# Patient Record
Sex: MALE | Race: WHITE | Employment: UNEMPLOYED | URBAN - METROPOLITAN AREA
[De-identification: names, ages, dates, MRNs, and addresses within clinical notes are randomized per-mention and may not be internally consistent; named-entity substitution may affect disease eponyms.]

---

## 2021-07-26 ENCOUNTER — HOSPITAL ENCOUNTER (OUTPATIENT)
Dept: PHYSICAL THERAPY | Age: 26
Discharge: HOME | End: 2021-07-26
Payer: COMMERCIAL

## 2021-07-26 PROCEDURE — 97110 THERAPEUTIC EXERCISES: CPT | Performed by: PHYSICAL THERAPIST

## 2021-07-26 PROCEDURE — 97116 GAIT TRAINING THERAPY: CPT | Performed by: PHYSICAL THERAPIST

## 2021-07-26 PROCEDURE — 97161 PT EVAL LOW COMPLEX 20 MIN: CPT | Performed by: PHYSICAL THERAPIST

## 2021-07-26 NOTE — PROGRESS NOTES
[facility]  [address], [address] [facility]    OUTPATIENT PHYSICAL THERAPY    INITIAL EVALUATION      NAME: Shan Petersen AGE: 27 y.o. GENDER: male  DATE: 7/26/2021  REFERRING PHYSICIAN: Logan Cárdenas MD    OBJECTIVE DATA SUMMARY:   Medical Diagnosis: R26.89 abnormalities of gait and mobility  PT Diagnosis: CVA   Date of Onset: 7/11/21  Mechanism of Injury/Chief Complaint: CVA causing R sided weakness and slurred speech. Patient indicates that a dysfunction at the [place] [facility] caused his CVA. Present Symptoms: R sided weakness, gait abnormality     Functional Deficits and Limitations:   []     Sitting:   []    Dressing:   [x]    Reaching:  [x]     Standing:   []     Bathing:   [x]    Lifting:  [x]     Walking:   [x]     Cooking:   []    Yardwork:  []     Sleeping:   [x]     Cleaning:   []     Driving:  []     Work Tasks:  [x]     Recreation:  [x]    Other: caring for grandmother    HISTORY:  Past Medical History:   No past medical history on file. No past surgical history on file. Precautions: Allergies: Pcn [penicillins]    Current Medications:    Medication    plavix    atorvastatin    Aspirin    Clopidogrel    Ferrous Sulfate    Fluconazole      Prior Level of Function/Home Situation: Independent living with and caring for grandmother    Personal factors and/or comorbidities impacting plan of care: stress    Social/Work History:  Helps care for grandmother     Previous Therapy:  None     SUBJECTIVE:   \"I don't know how it happened. I just woke up and my legs didn't work. I went to the hospital about a day after my symptoms started.  They said the wishbone artery in the back of my head wasn't working right\"    Patients goals for therapy: Get back to walking normal, get stronger so I can care for my grandma    OBJECTIVE DATA SUMMARY:   EXAMINATION/PRESENTATION/DECISION MAKING:   Pain:  Location: N/A  Quality: No pain reported  Now: 0/10  Best: 0/10  Worst: 0/10  Easing Factors: walking with rest breaks  Aggravating Factors: quick fatigue    Strength:      LEFT  RIGHT  Shoulder flexion 5/5  4/5  Shoulder abduction 5/5  5/5    Shoulder ER  5/5  4/5  Shoulder IR  5/5  5/5  Elbow flexion  5/5  5/5  Elbow extension 5/5  5/5  Wrist flexion  5/5  5/5  Wrist extension 5/5  4/5    Hip flexion  5/5  4/5  Hip abduction  5/5  4/5  Hip extension  5/5  4/5  Knee flexion  5/5  5/5  Knee extension 5/5  4+/5   Ankle DF  4/5  4/5  Ankle PF  3/5  2/5       Handheld dynamometer testing:  L hand 100lbs  R hand 75lbs    Range of Motion:   All ROM WNL    Neurologic Assessment:   Sensation: Decreased R sided light touch sensation on plantar foot   Reflexes: 3+ R sided reflexes (patellar, achilles, biceps, triceps)    Mobility:   Transitional Movements: slowed with use of BUE    Gait: Trendelenburg sign present, decreased R foot clearance    Functional Measure:   Tinetti: 12/28  Balance: 8/16  Gait: 4/12     Physical Therapy Evaluation Charge Determination   History Examination Presentation Decision-Making   LOW Complexity : Zero comorbidities / personal factors that will impact the outcome / POC LOW Complexity : 1-2 Standardized tests and measures addressing body structure, function, activity limitation and / or participation in recreation  LOW Complexity : Stable, uncomplicated  Other outcome measures Tinetti  LOW       Based on the above components, the patient evaluation is determined to be of the following complexity level: LOW     TREATMENT/INTERVENTION:  Modalities (Rationale): none today    Therapeutic Exercises: to develop strength, endurance, range of motion, and flexibility  Bridge  SL clamshell with RTB  Side stepping RTB  Shoulder flexion 5#  Gripping (power, key, pinch) with green putty    Manual Therapy: for joint mobilization/manipulations and soft tissue mobilization  None today    Neuro Re-Education: to improve movement, balance, coordination, kinesthetic sense, posture, and proprioception for sitting or standing balance  None today    Therapeutic Activities: to improve functional performance, power, or agility  None today    Ambulation/Gait Training:  Walking toward mirror for external feedback to reduce hip drop  Tandem walking    Activity tolerance and post treatment pain report:   Good, hip muscle soreness noted    Education:  [x]     Home exercise program provided. [facility].  URL: [email or web address]  Date: 07/26/2021  Prepared by: Nuvia Houston    Exercises  Supine Bridge - 2-3 x daily - 7 x weekly - 3 sets - 10 reps  Clamshell with Resistance - 2-3 x daily - 7 x weekly - 3 sets - 10 reps  Side Stepping with Resistance at Thighs - 2-3 x daily - 7 x weekly - 3 sets - 15-25ft hold  Tandem Walking with Counter Support - 1-2 x daily - 7 x weekly - 3 reps - 15-25 ft hold  Putty Squeezes - 2-3 x daily - 7 x weekly - 2min hold  3-Point Pinch with Putty - 2-3 x daily - 7 x weekly - 2min hold  Thumb MCP and IP Flexion with Putty - 2-3 x daily - 7 x weekly - 2min hold  Standing Shoulder Flexion to 90 Degrees with Dumbbells - 2-3 x daily - 7 x weekly - 3 sets - 10 reps    Education was provided to the patient on the following topics: HEP performance, gait mechanics. Patient verbalized understanding of the topics presented. ASSESSMENT:   Fly Bronson is a 27 y.o. male who presents with right sided weakness and decreased ambulatory ability following 7/11/21 CVA. Physical therapy problems based on objective data include: decrease ROM, decrease strength, impaired gait/ balance, decrease ADL/ functional abilitiies, decrease activity tolerance and decrease transfer abilities . Patient will benefit from skilled intervention to address these impairments to allow for improved ambulation and ability to care for family member. Rehabilitation potential is considered to be Good.       PLAN OF CARE:   Recommendations and Planned Interventions Include:  therapeutic activities, therapeutic exercises, gait training, balance training, manual therapy, neuro re-education, posture/biomechanics, traction, heat/ice, ultrasound, E-stim, home exercise program, TENS, pain management and dry needling     Frequency/Duration:  Patient will benefit from physical therapy visits 1-2 times per week over 8 weeks to optimize improvement in these areas. GOALS  Short term goals  Time frame: 4 weeks  1. Patient will be compliant and independent with the initial HEP as evidenced by being able to perform without cuing. 2. Patient will report a 50% improvement in symptoms. 3. Patient will maintain tandem stance for 30s without loss of balance. 4. Patient will have a show 4/5 knee extension strength to assist with standing and walking. 5. Patient will have an increased tolerance for standing to allow 45 minutes of the activity before symptoms start. 6. Patient will tolerate 45 minutes of clinic activities before onset of symptoms. Long term goals  Time frame: 8 weeks  1. Patient will have an improved score on the Tinetti outcome measure by 12 points to demonstrate a decreased fall risk. 2. Patient will be independent in final individualized HEP. 3. Patient will negotiate a flight of stairs without deviation. 4. Patient will have an increase in hip extension strength to 5/5 to allow performance of standing and walking tasks. 5. Patient will return to caregiving without being limited by symptoms. 6. Patient will show R hand  strength of 100lbs. [x]     Patient has participated in goal setting and agrees to work toward plan of care. [x]     Patient was instructed to call if questions or concerns arise.     Thank you for this referral.  Maria Antonia Herrera, PT   Time Calculation: 54 mins    Patient Time in clinic:   Start Time: 0900   Stop Time: 5025    TREATMENT PLAN EFFECTIVE DATES:   7/26/2021 TO 9/20/21  I have read the above plan of care for Laina Hua and certify the need for skilled physical therapy services.     Physician Signature: ____________________________________________________    Date: _________________________________________________________________

## 2021-08-02 ENCOUNTER — HOSPITAL ENCOUNTER (OUTPATIENT)
Dept: PHYSICAL THERAPY | Age: 26
Discharge: HOME | End: 2021-08-02
Payer: COMMERCIAL

## 2021-08-02 PROCEDURE — 97110 THERAPEUTIC EXERCISES: CPT | Performed by: PHYSICAL THERAPIST

## 2021-08-02 PROCEDURE — 97116 GAIT TRAINING THERAPY: CPT | Performed by: PHYSICAL THERAPIST

## 2021-08-02 NOTE — PROGRESS NOTES
[facility]  [address], [address] [facility]    OUTPATIENT PHYSICAL THERAPY DAILY TREATMENT NOTE  VISIT: 2    NAME: Noralee Phalen AGE: 27 y.o. GENDER: male  DATE: 8/2/2021  REFERRING PHYSICIAN: Brannon Carrasquillo MD    GOALS  Short term goals  Time frame: 4 weeks  1. Patient will be compliant and independent with the initial HEP as evidenced by being able to perform without cuing. 2. Patient will report a 50% improvement in symptoms. 3. Patient will maintain tandem stance for 30s without loss of balance. 4. Patient will have a show 4/5 knee extension strength to assist with standing and walking. 5. Patient will have an increased tolerance for standing to allow 45 minutes of the activity before symptoms start. 6. Patient will tolerate 45 minutes of clinic activities before onset of symptoms.      Long term goals  Time frame: 8 weeks  1. Patient will have an improved score on the Tinetti outcome measure by 12 points to demonstrate a decreased fall risk. 2. Patient will be independent in final individualized HEP. 3. Patient will negotiate a flight of stairs without deviation. 4. Patient will have an increase in hip extension strength to 5/5 to allow performance of standing and walking tasks. 5. Patient will return to caregiving without being limited by symptoms. 6. Patient will show R hand  strength of 100lbs. SUBJECTIVE:   \"My legs get sore with the exercises. My dad has noticed my walking has improved. I haven't had to walk up steps, but I have had some difficulty with hills. \"    Pain In: 0/10    OBJECTIVE DATA SUMMARY:     EXAMINATION/PRESENTATION/DECISION MAKING:   Pain:  Location: N/A  Quality: No pain reported  Now: 0/10  Best: 0/10  Worst: 0/10  Easing Factors: walking with rest breaks  Aggravating Factors: quick fatigue     Strength:                                       LEFT               RIGHT  Shoulder flexion          5/5 4/5  Shoulder abduction     5/5                   5/5                     Shoulder ER                5/5                   4/5  Shoulder IR                 5/5                   5/5  Elbow flexion               5/5                   5/5  Elbow extension          5/5                   5/5  Wrist flexion                5/5                   5/5  Wrist extension           5/5                   4/5     Hip flexion                   5/5                   4/5  Hip abduction              5/5                   4/5  Hip extension              5/5                   4/5  Knee flexion                5/5                   5/5  Knee extension           5/5                   4+/5       Ankle DF                     4/5                   4/5  Ankle PF                     3/5                   2/5         Handheld dynamometer testing:  L hand 100lbs  R hand 75lbs     Range of Motion:   All ROM WNL     Neurologic Assessment:              Sensation: Decreased R sided light touch sensation on plantar foot              Reflexes: 3+ R sided reflexes (patellar, achilles, biceps, triceps)     Mobility:              Transitional Movements: slowed with use of BUE               Gait: Trendelenburg sign present, decreased R foot clearance     Functional Measure:   Tinetti: 12/28  Balance: 8/16  Gait: 4/12     TREATMENT/INTERVENTION:  Modalities (Rationale): none today     Therapeutic Exercises: to develop strength, endurance, range of motion, and flexibility  Bridge with ECC lowering 3x 10  SL clamshell with GTB 2x 10  Side stepping GTB  Shoulder flexion GTB 2x 10  Gripping (power, key, pinch) with green putty  Power  with 3lb med ball 10x  Wrist extension 5# 10x  Wrist flexion 5# 10x    Manual Therapy: for joint mobilization/manipulations and soft tissue mobilization  None today     Neuro Re-Education: to improve movement, balance, coordination, kinesthetic sense, posture, and proprioception for sitting or standing balance  None today     Therapeutic Activities: to improve functional performance, power, or agility  None today     Ambulation/Gait Training:  Walking toward mirror for external feedback to reduce hip drop  Tandem walking 100ft  Step and stance training at bars with focus on hip and trunk position       Activity tolerance and post treatment pain report:   Good improved gait mechanics    Pain Out: 0/10    Education:  Education was provided to the patient on the following topics: Reducing [place] compensation and Trendelenburg sign. Safety awareness. [x]    No changes were made to the home exercise program.  []    The following changes were made to the home exercise program:     Access Code: [address] [place] [address] Avenue: [email or web address]  Date: 07/26/2021  Prepared by: Bruce [place]     Exercises  Supine Bridge - 2-3 x daily - 7 x weekly - 3 sets - 10 reps  Clamshell with Resistance - 2-3 x daily - 7 x weekly - 3 sets - 10 reps  Side Stepping with Resistance at Thighs - 2-3 x daily - 7 x weekly - 3 sets - 15-25ft hold  Tandem Walking with Counter Support - 1-2 x daily - 7 x weekly - 3 reps - 15-25 ft hold  Putty Squeezes - 2-3 x daily - 7 x weekly - 2min hold  3-Point Pinch with Putty - 2-3 x daily - 7 x weekly - 2min hold  Thumb MCP and IP Flexion with Putty - 2-3 x daily - 7 x weekly - 2min hold  Standing Shoulder Flexion to 90 Degrees with Dumbbells - 2-3 x daily - 7 x weekly - 3 sets - 10 reps    Patient verbalized understanding of the topics presented. ASSESSMENT:   Patient shows improved strength and gait mechanics since last visit. He continues to show over-reliance on L side and decreased strength in R extremities. Patient will continue to benefit from PT intervention to improve strength, gait mechanics, and safety.      Patients progression toward goals is as follows:  [x]     Improving appropriately and progressing toward goals  []     Improving slowly and progressing toward goals  []     Not making progress toward goals and plan of care will be adjusted    PLAN OF CARE:   Patient continues to benefit from skilled intervention to address the above impairments. [x]    Continue treatment per established plan of care. []     Recommend the following changes to the plan of care:     Recommendations/Intent for next treatment: Progress balance and gait activities as appropriate. Reassess  strength.     Lucien Harrison, PT   Time Calculation: 30 mins  Patient Time in clinic:   Start Time: 0900   Stop Time: 0041

## 2021-08-09 ENCOUNTER — HOSPITAL ENCOUNTER (OUTPATIENT)
Dept: PHYSICAL THERAPY | Age: 26
Discharge: HOME | End: 2021-08-09
Payer: COMMERCIAL

## 2021-08-09 PROCEDURE — 97110 THERAPEUTIC EXERCISES: CPT | Performed by: PHYSICAL THERAPIST

## 2021-08-09 PROCEDURE — 97112 NEUROMUSCULAR REEDUCATION: CPT | Performed by: PHYSICAL THERAPIST

## 2021-08-09 NOTE — PROGRESS NOTES
[facility]  [address], [address] [facility]    OUTPATIENT PHYSICAL THERAPY DAILY TREATMENT NOTE  VISIT: 3    NAME: Jesica Guadalupe AGE: 27 y.o. GENDER: male  DATE: 8/9/2021  REFERRING PHYSICIAN: Sherrie Feldman MD    GOALS  Short term goals  Time frame: 4 weeks  1. Patient will be compliant and independent with the initial HEP as evidenced by being able to perform without cuing. 2. Patient will report a 50% improvement in symptoms. 3. Patient will maintain tandem stance for 30s without loss of balance. 4. Patient will have a show 4/5 knee extension strength to assist with standing and walking. 5. Patient will have an increased tolerance for standing to allow 45 minutes of the activity before symptoms start. 6. Patient will tolerate 45 minutes of clinic activities before onset of symptoms.      Long term goals  Time frame: 8 weeks  1. Patient will have an improved score on the Tinetti outcome measure by 12 points to demonstrate a decreased fall risk. 2. Patient will be independent in final individualized HEP. 3. Patient will negotiate a flight of stairs without deviation. 4. Patient will have an increase in hip extension strength to 5/5 to allow performance of standing and walking tasks. 5. Patient will return to caregiving without being limited by symptoms. 6. Patient will show R hand  strength of 100lbs. SUBJECTIVE:   \"I am a little more wobbly today. I didn't sleep well last night because I had restless leg syndrome. \"    Pain In: 0/10    OBJECTIVE DATA SUMMARY:     EXAMINATION/PRESENTATION/DECISION MAKING:   Pain:  Location: N/A  Quality: No pain reported  Now: 0/10  Best: 0/10  Worst: 0/10  Easing Factors: walking with rest breaks  Aggravating Factors: quick fatigue     Strength:                                       LEFT               RIGHT  Shoulder flexion          5/5                   4/5  Shoulder abduction     5/5                   5/5 Shoulder ER                5/5                   4/5  Shoulder IR                 5/5                   5/5  Elbow flexion               5/5                   5/5  Elbow extension          5/5                   5/5  Wrist flexion                5/5                   5/5  Wrist extension           5/5                   4/5     Hip flexion                   5/5                   4/5  Hip abduction              5/5                   4/5  Hip extension              5/5                   4/5  Knee flexion                5/5                   5/5  Knee extension           5/5                   4+/5       Ankle DF                     4/5                   4/5  Ankle PF                     3/5                   2/5         Handheld dynamometer testing:  L hand 105lbs  R hand 85lbs     Range of Motion:   All ROM WNL     Neurologic Assessment:              Sensation: Decreased R sided light touch sensation on plantar foot              Reflexes: 3+ R sided reflexes (patellar, achilles, biceps, triceps)     Mobility:              Transitional Movements: slowed with use of BUE               Gait: Trendelenburg sign present, decreased R foot clearance     Functional Measure:   Tinetti: 12/28  Balance: 8/16  Gait: 4/12     TREATMENT/INTERVENTION:  Modalities (Rationale): none today     Therapeutic Exercises: to develop strength, endurance, range of motion, and flexibility  Supine:  Bridge with GTB and ECC lowering 3x 10  SL clamshell with GTB 3x 10 each leg    Standing:  Side stepping GTB  Shoulder flexion GTB 2x 10    Seated:  Gripping (power, key, pinch) with green putty  Power  with 3lb med ball 10x  Wrist extension 5# 10x  Wrist flexion 5# 10x    Manual Therapy: for joint mobilization/manipulations and soft tissue mobilization  None today     Neuro Re-Education: to improve movement, balance, coordination, kinesthetic sense, posture, and proprioception for sitting or standing balance  Steam boats YTB 15x each way R and LLE  Step ups onto BOSU blue up 10x  TKE YTB 3\" hold 2' total     Therapeutic Activities: to improve functional performance, power, or agility  Simulated driving/brake use with BOSU ball     Ambulation/Gait Training:  Walking toward mirror for external feedback to reduce hip drop  Tandem walking 100ft  Step and stance training at bars with focus on hip and trunk position     Activity tolerance and post treatment pain report:   Good improved gait mechanics    Pain Out: 0/10    Education:  Education was provided to the patient on the following topics: Reducing [place] compensation and Trendelenburg sign. Safety awareness. []    No changes were made to the home exercise program.  [x]    The following changes were made to the home exercise program:     Access Code: [address]: [email or web address]  Date: 08/09/2021  Prepared by: Gini Irizarry    Exercises  Supine Bridge - 2-3 x daily - 7 x weekly - 3 sets - 10 reps  Clamshell with Resistance - 2-3 x daily - 7 x weekly - 3 sets - 10 reps  Side Stepping with Resistance at Thighs - 2-3 x daily - 7 x weekly - 3 sets - 15-25ft hold  Tandem Walking with Counter Support - 1-2 x daily - 7 x weekly - 3 reps - 15-25 ft hold  Standing Terminal Knee Extension with Resistance - 1-2 x daily - 7 x weekly - 20 reps - 32 hold  Standing Hip Extension with Anchored Resistance - 1-2 x daily - 7 x weekly - 10 reps  Standing Hip Abduction with Anchored Resistance - 1-2 x daily - 7 x weekly - 10 reps  Standing Hip Flexion with Anchored Resistance and Chair Support - 1-2 x daily - 7 x weekly - 10 reps  Standing Hip Adduction with Anchored Resistance - 1-2 x daily - 7 x weekly - 10 reps    Patient verbalized understanding of the topics presented. ASSESSMENT:   Patient shows minimal UE impairments at this time, with a slight deficit in  strength that is improving.  Balance, quad activation, and proximal LE strength are patient's primary impairments and are contributing to his gait deficts. Patient will continue to benefit from PT intervention to improve strength, gait mechanics, and safety. Patients progression toward goals is as follows:  [x]     Improving appropriately and progressing toward goals  []     Improving slowly and progressing toward goals  []     Not making progress toward goals and plan of care will be adjusted    PLAN OF CARE:   Patient continues to benefit from skilled intervention to address the above impairments. [x]    Continue treatment per established plan of care. []     Recommend the following changes to the plan of care:     Recommendations/Intent for next treatment: Progress balance and gait activities as appropriate.     Isabella Medina, PT   Time Calculation: 35 mins  Patient Time in clinic:   Start Time: 7707   Stop Time: 3718

## 2021-08-16 ENCOUNTER — APPOINTMENT (OUTPATIENT)
Dept: PHYSICAL THERAPY | Age: 26
End: 2021-08-16
Payer: COMMERCIAL

## 2021-08-23 ENCOUNTER — HOSPITAL ENCOUNTER (OUTPATIENT)
Dept: PHYSICAL THERAPY | Age: 26
Discharge: HOME | End: 2021-08-23
Payer: COMMERCIAL

## 2021-08-23 PROCEDURE — 97110 THERAPEUTIC EXERCISES: CPT | Performed by: PHYSICAL THERAPIST

## 2021-08-23 PROCEDURE — 97112 NEUROMUSCULAR REEDUCATION: CPT | Performed by: PHYSICAL THERAPIST

## 2021-08-23 NOTE — PROGRESS NOTES
[facility]  [address], [address] [facility]    OUTPATIENT PHYSICAL THERAPY DAILY TREATMENT AND PROGRESS NOTE  VISIT: 4    NAME: Jorge Suh AGE: 27 y.o. GENDER: male  DATE: 8/23/2021  REFERRING PHYSICIAN: Crystal Merlin, MD    GOALS  Short term goals  Time frame: 4 weeks  1. Patient will be compliant and independent with the initial HEP as evidenced by being able to perform without cuing. MET  2. Patient will report a 50% improvement in symptoms. MET  3. Patient will maintain tandem stance for 30s without loss of balance. MET  4. Patient will have a show 4/5 knee extension strength to assist with standing and walking. MET  5. Patient will have an increased tolerance for standing to allow 45 minutes of the activity before symptoms start. In Progress  6. Patient will tolerate 45 minutes of clinic activities before onset of symptoms. MET     Long term goals  Time frame: 8 weeks  1. Patient will have an improved score on the Tinetti outcome measure by 12 points to demonstrate a decreased fall risk. In Progress  2. Patient will be independent in final individualized HEP. MET  3. Patient will negotiate a flight of stairs without deviation. MET  4. Patient will have an increase in hip extension strength to 5/5 to allow performance of standing and walking tasks. In Progress  5. Patient will return to caregiving without being limited by symptoms. In Progress  6. Patient will show R hand  strength of 100lbs. In Progress       SUBJECTIVE:   \"I am getting, better, but the cancer doctor doesn't know why I am still fatigues and my RBC, WBC, and platelet counts keep dropping. I am going to get a bone biopsy and hope they can figure out what's going on then. My balance and walking are a lot better, I just feel my R foot dragging when I get tired. I haven't fallen or tripped. \"    Pain In: 0/10    OBJECTIVE DATA SUMMARY:   EXAMINATION/PRESENTATION/DECISION MAKING:   --Updated 8/23/21--  Pain:  Location: N/A  Quality: No pain reported  Now: 0/10  Best: 0/10  Worst: 0/10  Easing Factors: walking with rest breaks  Aggravating Factors: quick fatigue     Strength:                                       LEFT               RIGHT  Shoulder flexion          5/5                   5/5  Shoulder abduction     5/5                   5/5                     Shoulder ER                5/5                   5/5  Shoulder IR                 5/5                   5/5  Elbow flexion               5/5                   5/5  Elbow extension          5/5                   5/5  Wrist flexion                5/5                   5/5  Wrist extension           5/5                   5/5     Hip flexion                   5/5                   5/5  Hip abduction              5/5                   5/5  Hip extension              5/5                   5/5  Knee flexion                5/5                   5/5  Knee extension           5/5                   4+/5       Ankle DF                     4+/5                   4/5  Ankle PF                     4/5                   3/5         Handheld dynamometer testing:  L hand 105lbs  R hand 85lbs     Range of Motion:   All ROM WNL     Neurologic Assessment:              Sensation: Decreased R sided light touch sensation on plantar foot              Reflexes: 3+ R sided reflexes (patellar, achilles, biceps, triceps)     Mobility:              Transitional Movements: slowed with use of BUE               Gait: Trendelenburg sign present, decreased R foot clearance     Functional Measure:   Tinetti: 12/28   Balance: 8/16   Gait: 4/12     TREATMENT/INTERVENTION:  Modalities (Rationale): none today     Therapeutic Exercises: to develop strength, endurance, range of motion, and flexibility  Supine:  Bridge with GTB and ECC lowering 3x 10  SL clamshell with GTB 3x 10 each leg    Standing:  Side stepping GTB  Shoulder flexion GTB 2x 10  Gastroc stretch 5x 10\" hold    Seated:  Gripping (power, key, pinch) with green putty  Power  with 3lb med ball 10x  Wrist extension 5# 10x  Wrist flexion 5# 10x  BAPS L2 M/L and A/P 20x each way  Ankle DF RTB 2x 20  Long sitting calf stretch 5x 10\"    Manual Therapy: for joint mobilization/manipulations and soft tissue mobilization  None today     Neuro Re-Education: to improve movement, balance, coordination, kinesthetic sense, posture, and proprioception for sitting or standing balance  Steam boats YTB 15x each way R and LLE  Step ups onto BOSU blue up 10x  TKE YTB 3\" hold 2' total  Tandem stance 3x 30\"     Therapeutic Activities: to improve functional performance, power, or agility  Simulated driving/brake use with BOSU ball     Ambulation/Gait Training:  Walking toward mirror for external feedback to reduce hip drop  Tandem walking 100ft  Step and stance training at bars with focus on hip and trunk position     Activity tolerance and post treatment pain report:   Good improved gait mechanics    Pain Out: 0/10    Education:  Education was provided to the patient on the following topics: Reducing [employer] compensation and Trendelenburg sign. Safety awareness. []    No changes were made to the home exercise program.  [x]    The following changes were made to the home exercise program:     Access Code: [address]: [email or web address]  Date: 08/23/2021  Prepared by: Rupert Gonzalez    Exercises  Supine Bridge - 2-3 x daily - 7 x weekly - 3 sets - 10 reps  Clamshell with Resistance - 2-3 x daily - 7 x weekly - 3 sets - 10 reps  Side Stepping with Resistance at Thighs - 2-3 x daily - 7 x weekly - 3 sets - 15-25ft hold  Tandem Walking with Counter Support - 1-2 x daily - 7 x weekly - 3 reps - 15-25 ft hold  Standing Terminal Knee Extension with Resistance - 1-2 x daily - 7 x weekly - 20 reps - 32 hold  Standing Hip Extension with Anchored Resistance - 1-2 x daily - 7 x weekly - 10 reps  Standing Hip Abduction with Anchored Resistance - 1-2 x daily - 7 x weekly - 10 reps  Standing Hip Flexion with Anchored Resistance and Chair Support - 1-2 x daily - 7 x weekly - 10 reps  Standing Hip Adduction with Anchored Resistance - 1-2 x daily - 7 x weekly - 10 reps  Long Sitting Ankle Dorsiflexion with Anchored Resistance - 1 x daily - 7 x weekly - 2 sets - 10 reps  Long Sitting Calf Stretch with Strap - 2-3 x daily - 7 x weekly - 10 reps - 10s hold  Standing Ankle Dorsiflexion Stretch - 2-3 x daily - 7 x weekly - 10 reps - 10s hold    Patient verbalized understanding of the topics presented. ASSESSMENT:   Patient still with R ankle weakness, his balance and gait mechanics are near normal. PT and patient discussed holding PT services at this time while he continues to receive diagnostic tests for his fatigue and low blood counts. Patient was encouraged to continue HEP and to contact clinic if he feels increased weakness or decreased steadiness on feet. Patients progression toward goals is as follows:  [x]     Improving appropriately and progressing toward goals  []     Improving slowly and progressing toward goals  []     Not making progress toward goals and plan of care will be adjusted    PLAN OF CARE:   Patient continues to benefit from skilled intervention to address the above impairments. [x]    Continue treatment per established plan of care. []     Recommend the following changes to the plan of care:     Recommendations/Intent for next treatment: Hold PT visits while receiving further diagnostics for fatigue and low blood counts.     Terry Vigil, PT   Time Calculation: 30 mins  Patient Time in clinic:   Start Time: 0901   Stop Time: 2498

## 2021-10-15 NOTE — PROGRESS NOTES
[facility]  [address], [address] [facility]    OUTPATIENT physical Therapy discharge note      10/15/2021:  Patient will be discharged from physical therapy at this time. Criteria for termination of care:    []           Patient has plateaued  []           Patient has not returned to therapy  []           Patient has missed three or more visits without prior notification  [x]           Other: Hold PT visits while receiving further diagnostics for fatigue and low blood counts. Patient was seen for 4 visits from 7/26/21 to 8/23/21. Please refer to the most recent progress note for the latest PT info available. If you need anything further faxed to you, please contact us at [phone].     Thank you for this referral.  Savana Wagner, PT

## 2022-10-13 ENCOUNTER — OFFICE VISIT (OUTPATIENT)
Dept: FAMILY MEDICINE CLINIC | Age: 27
End: 2022-10-13
Payer: MEDICAID

## 2022-10-13 VITALS
RESPIRATION RATE: 16 BRPM | OXYGEN SATURATION: 96 % | HEIGHT: 74 IN | DIASTOLIC BLOOD PRESSURE: 83 MMHG | WEIGHT: 315 LBS | HEART RATE: 75 BPM | TEMPERATURE: 98.2 F | SYSTOLIC BLOOD PRESSURE: 138 MMHG | BODY MASS INDEX: 40.43 KG/M2

## 2022-10-13 DIAGNOSIS — Z95.2 HISTORY OF MECHANICAL AORTIC VALVE REPLACEMENT: Primary | ICD-10-CM

## 2022-10-13 DIAGNOSIS — Z79.01 CURRENT USE OF LONG TERM ANTICOAGULATION: ICD-10-CM

## 2022-10-13 PROBLEM — D50.9 IRON DEFICIENCY ANEMIA: Status: ACTIVE | Noted: 2022-10-13

## 2022-10-13 PROBLEM — F32.9 MAJOR DEPRESSIVE DISORDER WITH CURRENT ACTIVE EPISODE: Status: ACTIVE | Noted: 2022-10-13

## 2022-10-13 PROBLEM — K21.9 GASTROESOPHAGEAL REFLUX DISEASE WITHOUT ESOPHAGITIS: Status: ACTIVE | Noted: 2022-10-13

## 2022-10-13 PROBLEM — G47.09 OTHER INSOMNIA: Status: ACTIVE | Noted: 2022-10-13

## 2022-10-13 PROBLEM — F11.21 OPIOID DEPENDENCE IN REMISSION (HCC): Status: ACTIVE | Noted: 2022-10-13

## 2022-10-13 PROBLEM — Z86.73 HISTORY OF STROKE: Status: ACTIVE | Noted: 2022-10-13

## 2022-10-13 LAB
INR BLD: 2.8
PT POC: 33.4 SECONDS
VALID INTERNAL CONTROL?: YES

## 2022-10-13 PROCEDURE — 85610 PROTHROMBIN TIME: CPT | Performed by: STUDENT IN AN ORGANIZED HEALTH CARE EDUCATION/TRAINING PROGRAM

## 2022-10-13 PROCEDURE — 99203 OFFICE O/P NEW LOW 30 MIN: CPT | Performed by: STUDENT IN AN ORGANIZED HEALTH CARE EDUCATION/TRAINING PROGRAM

## 2022-10-13 RX ORDER — BUPRENORPHINE AND NALOXONE 8; 2 MG/1; MG/1
FILM, SOLUBLE BUCCAL; SUBLINGUAL
COMMUNITY
Start: 2022-10-05

## 2022-10-13 RX ORDER — FLUCONAZOLE 200 MG/1
400 TABLET ORAL DAILY
COMMUNITY
Start: 2022-09-21

## 2022-10-13 RX ORDER — PANTOPRAZOLE SODIUM 40 MG/1
40 TABLET, DELAYED RELEASE ORAL DAILY
COMMUNITY
Start: 2022-08-26

## 2022-10-13 RX ORDER — FERROUS SULFATE TAB 325 MG (65 MG ELEMENTAL FE) 325 (65 FE) MG
325 TAB ORAL 2 TIMES DAILY
COMMUNITY
Start: 2022-09-21

## 2022-10-13 RX ORDER — TRAZODONE HYDROCHLORIDE 50 MG/1
50 TABLET ORAL
COMMUNITY
Start: 2021-12-24

## 2022-10-13 RX ORDER — CARVEDILOL 12.5 MG/1
12.5 TABLET ORAL 2 TIMES DAILY
COMMUNITY
Start: 2022-10-12

## 2022-10-13 RX ORDER — WARFARIN SODIUM 5 MG/1
TABLET ORAL
COMMUNITY

## 2022-10-13 RX ORDER — SERTRALINE HYDROCHLORIDE 50 MG/1
50 TABLET, FILM COATED ORAL DAILY
COMMUNITY

## 2022-10-13 NOTE — PROGRESS NOTES
Assessment/Plan:     Diagnoses and all orders for this visit:    1. History of mechanical aortic valve replacement  -     AMB POC PT/INR  -Patient has a history of an aortic mechanical valve on Coumadin for anticoagulation  -INR in office today within goal at 2.8  -Continue on current Coumadin dosage of 2.5 mg all days except for Saturdays when he takes 5 mg  -Given he has been very stable on his current dosage will follow-up in 1 month for repeat INR  -Requesting records from previous providers including previous PCP for review  -Of note patient states he had recent blood work done last month therefore will hold off on lab work today and request records    2. Current use of long term anticoagulation  -See plan above    Follow-up and Dispositions    Return in about 1 month (around 11/13/2022), or if symptoms worsen or fail to improve. Discussed expected course/resolution/complications of diagnosis in detail with patient. Medication risks/benefits/costs/interactions/alternatives discussed with patient. Pt expressed understanding with the diagnosis and plan    Subjective:      Kandis Hernandez is a 24 y.o. male who presents for had concerns including New Patient (Establish care ) and Coagulation disorder (Patient wants inr check ). Current Outpatient Medications   Medication Sig Dispense Refill    traZODone (DESYREL) 50 mg tablet Take 50 mg by mouth nightly. buprenorphine-naloxone (SUBOXONE) 8-2 mg film sublingaul film DISSOLVE UP TO 2 FILMS UNDER TONGUE EVERY DAY      carvediloL (COREG) 12.5 mg tablet Take 12.5 mg by mouth two (2) times a day. FeroSuL 325 mg (65 mg iron) tablet Take 325 mg by mouth two (2) times a day. fluconazole (DIFLUCAN) 200 mg tablet Take 400 mg by mouth daily. pantoprazole (PROTONIX) 40 mg tablet Take 40 mg by mouth daily. sertraline (ZOLOFT) 50 mg tablet Take 50 mg by mouth daily.       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 No pertinent surgical history. Family History   Problem Relation Age of Onset    Bipolar Disorder Mother     Hypertension Father      Social History     Socioeconomic History    Marital status: SINGLE     Spouse name: Not on file    Number of children: Not on file    Years of education: Not on file    Highest education level: Not on file   Occupational History    Not on file   Tobacco Use    Smoking status: Never    Smokeless tobacco: Never   Vaping Use    Vaping Use: Never used   Substance and Sexual Activity    Alcohol use: Not Currently     Comment: once a month    Drug use: Not Currently    Sexual activity: Not on file   Other Topics Concern    Not on file   Social History Narrative    Not on file     Social Determinants of Health     Financial Resource Strain: Not on file   Food Insecurity: Not on file   Transportation Needs: Not on file   Physical Activity: Insufficiently Active    Days of Exercise per Week: 1 day    Minutes of Exercise per Session: 10 min   Stress: Not on file   Social Connections: Not on file   Intimate Partner Violence: Not At Risk    Fear of Current or Ex-Partner: No    Emotionally Abused: No    Physically Abused: No    Sexually Abused: No   Housing Stability: Not on file       Patient is a 22-year-old male who presents to the office today to establish care and for an INR check. Patient has a history of an aortic mechanical valve replacement secondary to infection. Patient states that he gets his INR checked once per month and has been very well controlled on current dosage of Coumadin 2.5 mg all day except for on Saturdays which is 5 mg daily.   He states his INR is usually supposed to be between 2-3 although wonder if it should be 2.5-3.5. Will await records for review. Patient states he also has a history of depression and insomnia as well as acid reflux, iron deficiency anemia and stroke history. Patient states he has had a couple TIAs and a stroke noted when he had bacteremia. He is currently following with neurology every 6 months for routine follow-up. Also of note patient is following with infectious disease every 6 months due to a previous history of fungal infection in the blood about 1 year ago. Due to possibility of recurrence he remains on fluconazole consistently. Patient denies any chest pain, shortness of breath, abnormal bleeding including blood in the stool, headache, dizziness or any other acute concerns. ROS:   Review of Systems   Constitutional:  Negative for chills and fever. HENT:  Negative for congestion. Eyes:  Negative for blurred vision. Respiratory:  Negative for cough and shortness of breath. Cardiovascular:  Negative for chest pain, palpitations and leg swelling. Gastrointestinal:  Negative for abdominal pain, blood in stool, melena, nausea and vomiting. Neurological:  Negative for dizziness, tingling, weakness and headaches. Objective:   Visit Vitals  /83 (BP 1 Location: Right upper arm, BP Patient Position: Sitting, BP Cuff Size: Adult)   Pulse 75   Temp 98.2 °F (36.8 °C) (Temporal)   Resp 16   Ht 6' 1.5\" (1.867 m)   Wt 341 lb 9.6 oz (154.9 kg)   SpO2 96%   BMI 44.46 kg/m²         Vitals and Nurse Documentation reviewed. Physical Exam  Constitutional:       General: He is not in acute distress. Appearance: Normal appearance. He is obese. He is not toxic-appearing. HENT:      Head: Normocephalic and atraumatic. Eyes:      Conjunctiva/sclera: Conjunctivae normal.   Cardiovascular:      Rate and Rhythm: Normal rate and regular rhythm. Pulses: Normal pulses. Heart sounds: No murmur heard. No gallop. Comments: Systolic click appreciated on right and left sternal borders  Pulmonary:      Effort: Pulmonary effort is normal. No respiratory distress. Breath sounds: Normal breath sounds. No wheezing or rhonchi. Abdominal:      General: Bowel sounds are normal. There is no distension. Palpations: Abdomen is soft. Tenderness: There is no abdominal tenderness. There is no guarding. Musculoskeletal:      Right lower leg: No edema. Left lower leg: No edema. Neurological:      Mental Status: He is alert and oriented to person, place, and time.

## 2022-10-13 NOTE — PROGRESS NOTES
1. Have you been to the ER, urgent care clinic since your last visit? Hospitalized since your last visit? No    2. Have you seen or consulted any other health care providers outside of the [address] [place] [address] since your last visit? Include any pap smears or colon screening.   yes      Chief Complaint   Patient presents with    New Patient     Establish care     Coagulation disorder     Patient wants inr check

## 2022-11-14 ENCOUNTER — CLINICAL SUPPORT (OUTPATIENT)
Dept: FAMILY MEDICINE CLINIC | Age: 27
End: 2022-11-14
Payer: MEDICAID

## 2022-11-14 VITALS
BODY MASS INDEX: 45.34 KG/M2 | SYSTOLIC BLOOD PRESSURE: 133 MMHG | WEIGHT: 315 LBS | OXYGEN SATURATION: 95 % | HEART RATE: 72 BPM | DIASTOLIC BLOOD PRESSURE: 78 MMHG | RESPIRATION RATE: 16 BRPM | TEMPERATURE: 99.4 F

## 2022-11-14 DIAGNOSIS — Z79.01 CURRENT USE OF LONG TERM ANTICOAGULATION: Primary | ICD-10-CM

## 2022-11-14 LAB
INR BLD: 2.4
PT POC: 28.7 SECONDS
VALID INTERNAL CONTROL?: YES

## 2022-11-14 PROCEDURE — 85610 PROTHROMBIN TIME: CPT | Performed by: STUDENT IN AN ORGANIZED HEALTH CARE EDUCATION/TRAINING PROGRAM

## 2022-11-20 DIAGNOSIS — G47.09 OTHER INSOMNIA: Primary | ICD-10-CM

## 2022-11-20 DIAGNOSIS — F32.9 MAJOR DEPRESSIVE DISORDER WITH CURRENT ACTIVE EPISODE, UNSPECIFIED DEPRESSION EPISODE SEVERITY, UNSPECIFIED WHETHER RECURRENT: ICD-10-CM

## 2022-11-20 RX ORDER — SERTRALINE HYDROCHLORIDE 50 MG/1
50 TABLET, FILM COATED ORAL DAILY
Qty: 30 TABLET | Refills: 1 | Status: SHIPPED | OUTPATIENT
Start: 2022-11-20 | End: 2022-11-21 | Stop reason: SDUPTHER

## 2022-11-20 RX ORDER — TRAZODONE HYDROCHLORIDE 50 MG/1
50 TABLET ORAL
Qty: 30 TABLET | Refills: 1 | Status: SHIPPED | OUTPATIENT
Start: 2022-11-20 | End: 2022-11-21 | Stop reason: SDUPTHER

## 2022-11-21 DIAGNOSIS — F32.9 MAJOR DEPRESSIVE DISORDER WITH CURRENT ACTIVE EPISODE, UNSPECIFIED DEPRESSION EPISODE SEVERITY, UNSPECIFIED WHETHER RECURRENT: ICD-10-CM

## 2022-11-21 DIAGNOSIS — D50.9 IRON DEFICIENCY ANEMIA, UNSPECIFIED IRON DEFICIENCY ANEMIA TYPE: Primary | ICD-10-CM

## 2022-11-21 DIAGNOSIS — G47.09 OTHER INSOMNIA: ICD-10-CM

## 2022-11-21 RX ORDER — TRAZODONE HYDROCHLORIDE 50 MG/1
50 TABLET ORAL
Qty: 30 TABLET | Refills: 1 | Status: SHIPPED | OUTPATIENT
Start: 2022-11-21

## 2022-11-21 RX ORDER — FERROUS SULFATE TAB 325 MG (65 MG ELEMENTAL FE) 325 (65 FE) MG
325 TAB ORAL 2 TIMES DAILY
Qty: 60 TABLET | Refills: 1 | Status: SHIPPED | OUTPATIENT
Start: 2022-11-21

## 2022-11-21 RX ORDER — SERTRALINE HYDROCHLORIDE 50 MG/1
50 TABLET, FILM COATED ORAL DAILY
Qty: 30 TABLET | Refills: 1 | Status: SHIPPED | OUTPATIENT
Start: 2022-11-21

## 2022-12-30 ENCOUNTER — OFFICE VISIT (OUTPATIENT)
Dept: FAMILY MEDICINE CLINIC | Age: 27
End: 2022-12-30
Payer: MEDICAID

## 2022-12-30 VITALS
HEIGHT: 74 IN | BODY MASS INDEX: 40.43 KG/M2 | WEIGHT: 315 LBS | TEMPERATURE: 98 F | DIASTOLIC BLOOD PRESSURE: 82 MMHG | HEART RATE: 95 BPM | RESPIRATION RATE: 16 BRPM | OXYGEN SATURATION: 97 % | SYSTOLIC BLOOD PRESSURE: 146 MMHG

## 2022-12-30 DIAGNOSIS — Z95.2 HISTORY OF MECHANICAL AORTIC VALVE REPLACEMENT: ICD-10-CM

## 2022-12-30 DIAGNOSIS — Z79.01 ANTICOAGULANT LONG-TERM USE: Primary | ICD-10-CM

## 2022-12-30 DIAGNOSIS — R42 VERTIGO: ICD-10-CM

## 2022-12-30 DIAGNOSIS — G47.09 OTHER INSOMNIA: ICD-10-CM

## 2022-12-30 DIAGNOSIS — F32.9 MAJOR DEPRESSIVE DISORDER WITH CURRENT ACTIVE EPISODE, UNSPECIFIED DEPRESSION EPISODE SEVERITY, UNSPECIFIED WHETHER RECURRENT: ICD-10-CM

## 2022-12-30 LAB
INR BLD: 3.5
PT POC: 41.5 SECONDS
VALID INTERNAL CONTROL?: YES

## 2022-12-30 PROCEDURE — 99214 OFFICE O/P EST MOD 30 MIN: CPT | Performed by: STUDENT IN AN ORGANIZED HEALTH CARE EDUCATION/TRAINING PROGRAM

## 2022-12-30 PROCEDURE — 85610 PROTHROMBIN TIME: CPT | Performed by: STUDENT IN AN ORGANIZED HEALTH CARE EDUCATION/TRAINING PROGRAM

## 2022-12-30 RX ORDER — CARVEDILOL 12.5 MG/1
12.5 TABLET ORAL 2 TIMES DAILY
Qty: 180 TABLET | Refills: 1 | Status: SHIPPED | OUTPATIENT
Start: 2022-12-30

## 2022-12-30 RX ORDER — SERTRALINE HYDROCHLORIDE 100 MG/1
100 TABLET, FILM COATED ORAL DAILY
Qty: 30 TABLET | Refills: 1 | Status: SHIPPED | OUTPATIENT
Start: 2022-12-30

## 2022-12-30 RX ORDER — TRAZODONE HYDROCHLORIDE 50 MG/1
50 TABLET ORAL
Qty: 90 TABLET | Refills: 1 | Status: SHIPPED | OUTPATIENT
Start: 2022-12-30

## 2022-12-30 NOTE — PROGRESS NOTES
Assessment/Plan:     Diagnoses and all orders for this visit:    1. Anticoagulant long-term use  -     AMB POC PT/INR  -Patient has a history of an aortic mechanical valve on Coumadin for anticoagulation  -INR in office today slightly elevated at 3.5  -Decrease current Coumadin dosage to 2.5 mg all days   -Repeat INR in 1 week     2. History of mechanical aortic valve replacement  -     carvediloL (COREG) 12.5 mg tablet; Take 1 Tablet by mouth two (2) times a day. -Chronic.   -Has an On-X mechanical aortic valve   -Recommend scheduling follow-up with cardiologist   -Continue anticoagulation. See plan above     3. Major depressive disorder with current active episode, unspecified depression episode severity, unspecified whether recurrent  -     traZODone (DESYREL) 50 mg tablet; Take 1 Tablet by mouth nightly. -     sertraline (ZOLOFT) 100 mg tablet; Take 1 Tablet by mouth daily.  -Chronic. Uncontrolled. Denies any SI.  -Patient has noted some benefit with sertraline however continues to have persistent symptoms.  -Can increase sertraline from 50 mg to now 100 mg daily  -Continue current dosage of trazodone  -Follow-up in 4 weeks for reevaluation    4. Other insomnia  -     traZODone (DESYREL) 50 mg tablet; Take 1 Tablet by mouth nightly.  -Chronic. Stable. -Continue on trazodone 50 mg at bedtime    5. Vertigo  -Patient endorsed 2 separate episodes of vertigo type symptoms this past month. Last episode 2 weeks ago  -Patient currently asymptomatic. Normal neuro exam in office today.  -Suspect this could have been BPPV although uncertain given he has not had any symptoms for the past few weeks  -Discussed follow-up if symptoms recur  -Also advised to follow-up with cardiologist for routine check and since he has not been in for some time    Follow-up and Dispositions    Return in about 4 weeks (around 1/27/2023), or if symptoms worsen or fail to improve, for 1 week nurse visit for INR check .          Discussed expected course/resolution/complications of diagnosis in detail with patient. Medication risks/benefits/costs/interactions/alternatives discussed with patient. Pt expressed understanding with the diagnosis and plan    Subjective:      Petros Vergara is a 24 y.o. male who presents for had concerns including Anticoagulation. Current Outpatient Medications   Medication Sig Dispense Refill    traZODone (DESYREL) 50 mg tablet Take 1 Tablet by mouth nightly. 90 Tablet 1    sertraline (ZOLOFT) 100 mg tablet Take 1 Tablet by mouth daily. 30 Tablet 1    carvediloL (COREG) 12.5 mg tablet Take 1 Tablet by mouth two (2) times a day. 180 Tablet 1    FeroSuL 325 mg (65 mg iron) tablet Take 1 Tablet by mouth two (2) times a day. 60 Tablet 1    buprenorphine-naloxone (SUBOXONE) 8-2 mg film sublingaul film DISSOLVE UP TO 2 FILMS UNDER TONGUE EVERY DAY      fluconazole (DIFLUCAN) 200 mg tablet Take 400 mg by mouth daily. pantoprazole (PROTONIX) 40 mg tablet Take 40 mg by mouth daily.       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    Stroke [facility])     Patient had metastatic septic emboli including cerebral infarcts; right basal ganglia, right corona radiata, punctate infarcts, left corona radiata     Past Surgical History:   Procedure Laterality Date    HX AORTIC VALVE REPLACEMENT  09/30/2021     Family History   Problem Relation Age of Onset    Bipolar Disorder Mother     Hypertension Father      Social History     Socioeconomic History Marital status: SINGLE     Spouse name: Not on file    Number of children: Not on file    Years of education: Not on file    Highest education level: Not on file   Occupational History    Not on file   Tobacco Use    Smoking status: Never    Smokeless tobacco: Never   Vaping Use    Vaping Use: Never used   Substance and Sexual Activity    Alcohol use: Not Currently     Comment: once a month    Drug use: Not Currently    Sexual activity: Not on file   Other Topics Concern    Not on file   Social History Narrative    Not on file     Social Determinants of Health     Financial Resource Strain: Not on file   Food Insecurity: Not on file   Transportation Needs: Not on file   Physical Activity: Insufficiently Active    Days of Exercise per Week: 1 day    Minutes of Exercise per Session: 10 min   Stress: Not on file   Social Connections: Not on file   Intimate Partner Violence: Not At Risk    Fear of Current or Ex-Partner: No    Emotionally Abused: No    Physically Abused: No    Sexually Abused: No   Housing Stability: Not on file       Patient is a 72-year-old male who presents to the office today for medication refill as well as INR check. Patient does have a history of a mechanical aortic valve that is an On-X valve. His INR goal is 2-3. Patient states he has been consistent with taking his warfarin. He denies any abnormal bleeding including no blood in the stool, nosebleeds or blood in the urine. He denies any changes in his diet. His INR is 3.5 today. Patient also has a history of insomnia that is currently well controlled with trazodone at bedtime. He also has a history of depression. Patient does feel that there are several days throughout the week that his depression will be worse than others. He has noted some benefit with the sertraline 50 mg daily. He denies any SI/HI. Finally of note patient does endorse 2 separate episodes of dizziness.   He denies any lightheadedness, loss of consciousness, chest pain, shortness of breath, palpitations or any other neurological signs or symptoms. He did have 2 episodes in the past month with the most recent one being almost 2 weeks ago. He states he did feel like the room was moving and improved after laying down for a short while. He is currently asymptomatic. Patient does currently take carvedilol twice daily for blood pressure control since his aortic valve replacement. His BP is minimally elevated today due to being out of the medication for over a week now. ROS:   Review of Systems   Constitutional:  Negative for chills and fever. HENT:  Negative for congestion. Eyes:  Negative for blurred vision. Respiratory:  Negative for cough and shortness of breath. Cardiovascular:  Negative for chest pain, palpitations and leg swelling. Gastrointestinal:  Negative for abdominal pain, nausea and vomiting. Neurological:  Negative for dizziness, tingling, weakness and headaches. Objective:   Visit Vitals  BP (!) 146/82   Pulse 95   Temp 98 °F (36.7 °C) (Skin)   Resp 16   Ht 6' 2\" (1.88 m)   Wt 326 lb (147.9 kg)   SpO2 97%   BMI 41.86 kg/m²         Vitals and Nurse Documentation reviewed. Physical Exam  Constitutional:       General: He is not in acute distress. Appearance: Normal appearance. He is obese. He is not toxic-appearing. HENT:      Head: Normocephalic and atraumatic. Right Ear: Tympanic membrane and ear canal normal.      Left Ear: Tympanic membrane and ear canal normal.      Mouth/Throat:      Mouth: Mucous membranes are moist.      Pharynx: Oropharynx is clear. No oropharyngeal exudate or posterior oropharyngeal erythema. Eyes:      Extraocular Movements: Extraocular movements intact. Right eye: No nystagmus. Left eye: No nystagmus. Conjunctiva/sclera: Conjunctivae normal.      Pupils: Pupils are equal, round, and reactive to light.       Comments: Hudson-Hallpike maneuver negative bilaterally   Cardiovascular: Rate and Rhythm: Normal rate and regular rhythm. Pulses: Normal pulses. Heart sounds: No murmur heard. No gallop. Comments:  Systolic click appreciated on right and left sternal borders  Pulmonary:      Effort: Pulmonary effort is normal. No respiratory distress. Breath sounds: Normal breath sounds. No wheezing or rhonchi. Abdominal:      General: Bowel sounds are normal. There is no distension. Palpations: Abdomen is soft. Tenderness: There is no abdominal tenderness. There is no guarding. Musculoskeletal:      Cervical back: Neck supple. Right lower leg: No edema. Left lower leg: No edema. Neurological:      Mental Status: He is alert and oriented to person, place, and time. Sensory: Sensation is intact. Motor: Motor function is intact. Coordination: Coordination is intact. Coordination normal. Finger-Nose-Finger Test and Heel to [facility] Test normal.      Deep Tendon Reflexes:      Reflex Scores:       Bicep reflexes are 2+ on the right side and 2+ on the left side. Patellar reflexes are 2+ on the right side and 2+ on the left side. 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         Mood and Affect: Affect normal. Mood is depressed. Speech: Speech normal.         Behavior: Behavior normal. Behavior is cooperative. Thought Content: Thought content normal. Thought content does not include homicidal or suicidal ideation.        Results for orders placed or performed in visit on 12/30/22   AMB POC PT/INR   Result Value Ref Range    VALID INTERNAL CONTROL POC Yes     Prothrombin time (POC) 41.5 seconds    INR POC 3.5

## 2022-12-30 NOTE — PROGRESS NOTES
1. Have you been to the ER, urgent care clinic since your last visit? Hospitalized since your last visit? Yes When: 12/2022 Where: [facility] Reason for visit: covid positive     2. Have you seen or consulted any other health care providers outside of the [address] [place] [address] since your last visit? Include any pap smears or colon screening. No    Chief Complaint   Patient presents with    Anticoagulation     Health Maintenance Due   Topic Date Due    Hepatitis C Screening  Never done    DTaP/Tdap/Td series (2 - Td or Tdap) 04/28/2018    Flu Vaccine (1) Never done    COVID-19 Vaccine (2 - Pfizer series) 09/22/2022     3 most recent PHQ Screens 12/30/2022   Little interest or pleasure in doing things Several days   Feeling down, depressed, irritable, or hopeless Several days   Total Score PHQ 2 2     Abuse Screening Questionnaire 12/30/2022   Do you ever feel afraid of your partner? N   Are you in a relationship with someone who physically or mentally threatens you? N   Is it safe for you to go home?  Y     Learning Assessment 4/4/2016   PRIMARY LEARNER Patient   HIGHEST LEVEL OF EDUCATION - PRIMARY LEARNER  GRADUATED HIGH SCHOOL OR GED   BARRIERS PRIMARY LEARNER NONE   CO-LEARNER CAREGIVER No   PRIMARY LANGUAGE ENGLISH   LEARNER PREFERENCE PRIMARY DEMONSTRATION   ANSWERED BY patient   RELATIONSHIP SELF     Visit Vitals  BP (!) 149/80 (BP 1 Location: Left upper arm, BP Patient Position: Sitting, BP Cuff Size: Adult)   Pulse 95   Temp 98 °F (36.7 °C) (Skin)   Resp 16   Ht 6' 2\" (1.88 m)   Wt 326 lb (147.9 kg)   SpO2 (!) 83%   BMI 41.86 kg/m²

## 2023-01-06 ENCOUNTER — CLINICAL SUPPORT (OUTPATIENT)
Dept: FAMILY MEDICINE CLINIC | Age: 28
End: 2023-01-06
Payer: MEDICAID

## 2023-01-06 ENCOUNTER — OFFICE VISIT (OUTPATIENT)
Dept: FAMILY MEDICINE CLINIC | Age: 28
End: 2023-01-06

## 2023-01-06 VITALS
HEART RATE: 72 BPM | OXYGEN SATURATION: 97 % | DIASTOLIC BLOOD PRESSURE: 75 MMHG | SYSTOLIC BLOOD PRESSURE: 152 MMHG | WEIGHT: 315 LBS | RESPIRATION RATE: 16 BRPM | TEMPERATURE: 98.2 F | BODY MASS INDEX: 43.63 KG/M2

## 2023-01-06 VITALS
SYSTOLIC BLOOD PRESSURE: 128 MMHG | DIASTOLIC BLOOD PRESSURE: 68 MMHG | BODY MASS INDEX: 43.63 KG/M2 | TEMPERATURE: 98.2 F | WEIGHT: 315 LBS | RESPIRATION RATE: 16 BRPM | OXYGEN SATURATION: 97 % | HEART RATE: 72 BPM

## 2023-01-06 DIAGNOSIS — Z95.2 HISTORY OF MECHANICAL AORTIC VALVE REPLACEMENT: ICD-10-CM

## 2023-01-06 DIAGNOSIS — Z79.01 ANTICOAGULANT LONG-TERM USE: Primary | ICD-10-CM

## 2023-01-06 DIAGNOSIS — Z79.01 CURRENT USE OF LONG TERM ANTICOAGULATION: Primary | ICD-10-CM

## 2023-01-06 LAB
INR BLD: 3.9
PT POC: 47.3 SECONDS
VALID INTERNAL CONTROL?: YES

## 2023-01-06 PROCEDURE — 99213 OFFICE O/P EST LOW 20 MIN: CPT | Performed by: STUDENT IN AN ORGANIZED HEALTH CARE EDUCATION/TRAINING PROGRAM

## 2023-01-06 PROCEDURE — 85610 PROTHROMBIN TIME: CPT | Performed by: STUDENT IN AN ORGANIZED HEALTH CARE EDUCATION/TRAINING PROGRAM

## 2023-01-06 NOTE — PROGRESS NOTES
Assessment/Plan:     Diagnoses and all orders for this visit:    1. Current use of long term anticoagulation  -Patient has a history of an aortic mechanical valve on Coumadin for anticoagulation  -INR in office today slightly elevated at 3.9. INR goal 2-3  -Decrease current Coumadin dosage to 2.5 mg all days except saturdays which he will now skip  -Repeat INR in 1 week     2. History of mechanical aortic valve replacement  -Has an On-X mechanical aortic valve   -Recommend scheduling follow-up with cardiologist   -Continue anticoagulation. See plan above     Follow-up and Dispositions    Return in about 1 week (around 1/13/2023), or if symptoms worsen or fail to improve. Discussed expected course/resolution/complications of diagnosis in detail with patient. Medication risks/benefits/costs/interactions/alternatives discussed with patient. Pt expressed understanding with the diagnosis and plan    Subjective:      Leah Varma is a 24 y.o. male who presents for had concerns including Coagulation disorder (Elevated at 3.9 INR). Current Outpatient Medications   Medication Sig Dispense Refill    traZODone (DESYREL) 50 mg tablet Take 1 Tablet by mouth nightly. 90 Tablet 1    sertraline (ZOLOFT) 100 mg tablet Take 1 Tablet by mouth daily. 30 Tablet 1    carvediloL (COREG) 12.5 mg tablet Take 1 Tablet by mouth two (2) times a day. 180 Tablet 1    FeroSuL 325 mg (65 mg iron) tablet Take 1 Tablet by mouth two (2) times a day. 60 Tablet 1    buprenorphine-naloxone (SUBOXONE) 8-2 mg film sublingaul film DISSOLVE UP TO 2 FILMS UNDER TONGUE EVERY DAY      fluconazole (DIFLUCAN) 200 mg tablet Take 400 mg by mouth daily. pantoprazole (PROTONIX) 40 mg tablet Take 40 mg by mouth daily.       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)     Patient had metastatic septic emboli including cerebral infarcts; right basal ganglia, right corona radiata, punctate infarcts, left corona radiata     Past Surgical History:   Procedure Laterality Date    HX AORTIC VALVE REPLACEMENT  09/30/2021     Family History   Problem Relation Age of Onset    Bipolar Disorder Mother     Hypertension Father      Social History     Socioeconomic History    Marital status: SINGLE     Spouse name: Not on file    Number of children: Not on file    Years of education: Not on file    Highest education level: Not on file   Occupational History    Not on file   Tobacco Use    Smoking status: Never    Smokeless tobacco: Never   Vaping Use    Vaping Use: Never used   Substance and Sexual Activity    Alcohol use: Not Currently     Comment: once a month    Drug use: Not Currently    Sexual activity: Not on file   Other Topics Concern    Not on file   Social History Narrative    Not on file     Social Determinants of Health     Financial Resource Strain: Not on file   Food Insecurity: Not on file   Transportation Needs: Not on file   Physical Activity: Insufficiently Active    Days of Exercise per Week: 1 day    Minutes of Exercise per Session: 10 min   Stress: Not on file   Social Connections: Not on file   Intimate Partner Violence: Not At Risk    Fear of Current or Ex-Partner: No    Emotionally Abused: No    Physically Abused: No    Sexually Abused: No   Housing Stability: Not on file       Patient is a 21-year-old male who presents the office today for INR check. Patient's INR is mildly elevated at 3.9.   Patient denies any extra dosages, change in diet, abnormal bleeding or any other acute concerns. He states he has had this occur in the past where his INR will briefly elevate and then normalize. He is currently on warfarin 2.5 milligrams daily. ROS:   Review of Systems   Constitutional:  Negative for chills and fever. HENT:  Negative for congestion. Respiratory:  Negative for cough and shortness of breath. Cardiovascular:  Negative for chest pain and leg swelling. Gastrointestinal:  Negative for abdominal pain, nausea and vomiting. Neurological:  Negative for dizziness, tingling, weakness and headaches. Objective:   Visit Vitals  /68   Pulse 72   Temp 98.2 °F (36.8 °C) (Temporal)   Resp 16   Wt 339 lb 12.8 oz (154.1 kg)   SpO2 97%   BMI 43.63 kg/m²         Vitals and Nurse Documentation reviewed. Physical Exam  Constitutional:       General: He is not in acute distress. Appearance: Normal appearance. He is obese. He is not toxic-appearing. HENT:      Head: Normocephalic and atraumatic. Eyes:      Conjunctiva/sclera: Conjunctivae normal.   Cardiovascular:      Rate and Rhythm: Normal rate and regular rhythm. Pulses: Normal pulses. Heart sounds: No murmur heard. No gallop. Comments: Systolic click appreciated on right and left sternal borders  Pulmonary:      Effort: Pulmonary effort is normal. No respiratory distress. Breath sounds: Normal breath sounds. No wheezing or rhonchi. Abdominal:      General: Bowel sounds are normal. There is no distension. Palpations: Abdomen is soft. Tenderness: There is no abdominal tenderness. There is no guarding. Musculoskeletal:      Right lower leg: No edema. Left lower leg: No edema. Neurological:      Mental Status: He is alert and oriented to person, place, and time.       Gait: Gait normal.       Results for orders placed or performed in visit on 01/06/23   AMB POC PT/INR   Result Value Ref Range    VALID INTERNAL CONTROL POC Yes Prothrombin time (POC) 47.3 seconds    INR POC 3.9

## 2023-01-06 NOTE — PROGRESS NOTES
1. Have you been to the ER, urgent care clinic since your last visit? Hospitalized since your last visit? Yes  Where: [facility]  For: Covid +  When: December 2022    2. Have you seen or consulted any other health care providers outside of the [address] [place] [address] since your last visit? Include any pap smears or colon screening.  No          Chief Complaint   Patient presents with    Coagulation disorder     Elevated

## 2023-01-20 ENCOUNTER — CLINICAL SUPPORT (OUTPATIENT)
Dept: FAMILY MEDICINE CLINIC | Age: 28
End: 2023-01-20
Payer: MEDICAID

## 2023-01-20 VITALS
BODY MASS INDEX: 39.17 KG/M2 | TEMPERATURE: 97.9 F | RESPIRATION RATE: 16 BRPM | OXYGEN SATURATION: 98 % | HEART RATE: 74 BPM | WEIGHT: 315 LBS | DIASTOLIC BLOOD PRESSURE: 74 MMHG | HEIGHT: 75 IN | SYSTOLIC BLOOD PRESSURE: 128 MMHG

## 2023-01-20 DIAGNOSIS — Z79.01 CURRENT USE OF LONG TERM ANTICOAGULATION: Primary | ICD-10-CM

## 2023-01-20 LAB
INR BLD: 3
PT POC: 36 SECONDS
VALID INTERNAL CONTROL?: YES

## 2023-01-20 NOTE — PROGRESS NOTES
1. Have you been to the ER, urgent care clinic since your last visit? Hospitalized since your last visit? No    2. Have you seen or consulted any other health care providers outside of the [address] [place] [address] since your last visit? Include any pap smears or colon screening.  No    Chief Complaint   Patient presents with    Anticoagulation     Health Maintenance Due   Topic Date Due    Hepatitis C Screening  Never done    DTaP/Tdap/Td series (2 - Td or Tdap) 04/28/2018    Flu Vaccine (1) Never done    COVID-19 Vaccine (2 - Pfizer series) 09/22/2022     Visit Vitals  /74 (BP 1 Location: Left upper arm, BP Patient Position: Sitting, BP Cuff Size: Adult)   Pulse 74   Temp 97.9 °F (36.6 °C) (Skin)   Resp 16   Ht 6' 3\" (1.905 m)   Wt 339 lb (153.8 kg)   SpO2 98%   BMI 42.37 kg/m²

## 2023-01-27 ENCOUNTER — CLINICAL SUPPORT (OUTPATIENT)
Dept: FAMILY MEDICINE CLINIC | Age: 28
End: 2023-01-27
Payer: MEDICAID

## 2023-01-27 VITALS
OXYGEN SATURATION: 96 % | BODY MASS INDEX: 39.17 KG/M2 | HEIGHT: 75 IN | TEMPERATURE: 97.2 F | RESPIRATION RATE: 16 BRPM | WEIGHT: 315 LBS | SYSTOLIC BLOOD PRESSURE: 138 MMHG | HEART RATE: 72 BPM | DIASTOLIC BLOOD PRESSURE: 82 MMHG

## 2023-01-27 DIAGNOSIS — Z79.01 CURRENT USE OF LONG TERM ANTICOAGULATION: Primary | ICD-10-CM

## 2023-01-27 LAB
INR BLD: 1.9
PT POC: 22.6 SECONDS
VALID INTERNAL CONTROL?: YES

## 2023-01-27 PROCEDURE — 85610 PROTHROMBIN TIME: CPT | Performed by: FAMILY MEDICINE

## 2023-01-27 NOTE — PROGRESS NOTES
1. Have you been to the ER, urgent care clinic since your last visit? Hospitalized since your last visit? No    2. Have you seen or consulted any other health care providers outside of the [address] [place] [address] since your last visit? Include any pap smears or colon screening. No    Chief Complaint   Patient presents with    Anticoagulation     Health Maintenance Due   Topic Date Due    Hepatitis C Screening  Never done    DTaP/Tdap/Td series (2 - Td or Tdap) 04/28/2018    Flu Vaccine (1) Never done    COVID-19 Vaccine (2 - Pfizer series) 09/22/2022     Visit Vitals  /82 (BP 1 Location: Right upper arm, BP Patient Position: Sitting, BP Cuff Size: Adult)   Pulse 72   Temp 97.2 °F (36.2 °C) (Skin)   Resp 16   Ht 6' 3\" (1.905 m)   Wt 339 lb (153.8 kg)   SpO2 96%   BMI 42.37 kg/m²     Ya, NP notified. Patient agrees to come back to office in one week. Patient reports nosebleed this week. Patient instructed to report to Urgent care if nosebleed occur.   Patient verbalized understanding

## 2023-02-03 ENCOUNTER — OFFICE VISIT (OUTPATIENT)
Dept: FAMILY MEDICINE CLINIC | Age: 28
End: 2023-02-03

## 2023-02-03 ENCOUNTER — CLINICAL SUPPORT (OUTPATIENT)
Dept: FAMILY MEDICINE CLINIC | Age: 28
End: 2023-02-03

## 2023-02-03 VITALS
HEART RATE: 75 BPM | SYSTOLIC BLOOD PRESSURE: 129 MMHG | OXYGEN SATURATION: 98 % | DIASTOLIC BLOOD PRESSURE: 78 MMHG | TEMPERATURE: 97.4 F | BODY MASS INDEX: 39.17 KG/M2 | WEIGHT: 315 LBS | RESPIRATION RATE: 16 BRPM | HEIGHT: 75 IN

## 2023-02-03 VITALS
HEART RATE: 75 BPM | SYSTOLIC BLOOD PRESSURE: 129 MMHG | DIASTOLIC BLOOD PRESSURE: 78 MMHG | TEMPERATURE: 97.4 F | WEIGHT: 315 LBS | HEIGHT: 75 IN | OXYGEN SATURATION: 98 % | RESPIRATION RATE: 16 BRPM | BODY MASS INDEX: 39.17 KG/M2

## 2023-02-03 DIAGNOSIS — Z79.01 CURRENT USE OF LONG TERM ANTICOAGULATION: Primary | ICD-10-CM

## 2023-02-03 DIAGNOSIS — Z95.2 HISTORY OF MECHANICAL AORTIC VALVE REPLACEMENT: ICD-10-CM

## 2023-02-03 LAB
INR BLD: 1.8
PT POC: 21.4 SECONDS
VALID INTERNAL CONTROL?: YES

## 2023-02-03 NOTE — PROGRESS NOTES
1. Have you been to the ER, urgent care clinic since your last visit? Hospitalized since your last visit? No    2. Have you seen or consulted any other health care providers outside of the [address] [place] [address] since your last visit? Include any pap smears or colon screening. No    Chief Complaint   Patient presents with    Coagulation disorder     1.8  Pt 21.4     Health Maintenance Due   Topic Date Due    Hepatitis C Screening  Never done    DTaP/Tdap/Td series (2 - Td or Tdap) 04/28/2018    Flu Vaccine (1) Never done    COVID-19 Vaccine (2 - Pfizer series) 09/22/2022     3 most recent PHQ Screens 2/3/2023   Little interest or pleasure in doing things Not at all   Feeling down, depressed, irritable, or hopeless Not at all   Total Score PHQ 2 0     Abuse Screening Questionnaire 2/3/2023   Do you ever feel afraid of your partner? N   Are you in a relationship with someone who physically or mentally threatens you? N   Is it safe for you to go home?  Y     Learning Assessment 4/4/2016   PRIMARY LEARNER Patient   HIGHEST LEVEL OF EDUCATION - PRIMARY LEARNER  GRADUATED HIGH SCHOOL OR GED   BARRIERS PRIMARY LEARNER NONE   CO-LEARNER CAREGIVER No   PRIMARY LANGUAGE ENGLISH   LEARNER PREFERENCE PRIMARY DEMONSTRATION   ANSWERED BY patient   RELATIONSHIP SELF     Visit Vitals  /78 (BP 1 Location: Right upper arm, BP Patient Position: Sitting, BP Cuff Size: Adult)   Pulse 75   Temp 97.4 °F (36.3 °C) (Skin)   Resp 16   Ht 6' 3\" (1.905 m)   Wt 340 lb (154.2 kg)   SpO2 98%   BMI 42.50 kg/m²

## 2023-02-03 NOTE — PROGRESS NOTES
Assessment/Plan:     Diagnoses and all orders for this visit:    1. Current use of long term anticoagulation  -Patient has a history of an aortic mechanical valve on Coumadin for anticoagulation  -INR in office today slightly low at 1.8. INR goal 2-3  -Increase current Coumadin dosage from 2.5 mg all days except saturdays to now 2.5 mg daily on all days  -Repeat INR in 1-2 weeks    2. History of mechanical aortic valve replacement  -Has an On-X mechanical aortic valve   -Per patient has upcoming cardiologist appointment  -Continue anticoagulation. See plan above     Follow-up and Dispositions    Return in about 1 week (around 2/10/2023), or if symptoms worsen or fail to improve. Discussed expected course/resolution/complications of diagnosis in detail with patient. Medication risks/benefits/costs/interactions/alternatives discussed with patient. Pt expressed understanding with the diagnosis and plan. Subjective:      Meme Mabry is a 24 y.o. male who presents for had concerns including Coagulation disorder (1.8/Pt 21.4). Current Outpatient Medications   Medication Sig Dispense Refill    FeroSuL 325 mg (65 mg iron) tablet TAKE 1 TABLET BY MOUTH TWICE DAILY 60 Tablet 2    traZODone (DESYREL) 50 mg tablet Take 1 Tablet by mouth nightly. 90 Tablet 1    sertraline (ZOLOFT) 100 mg tablet Take 1 Tablet by mouth daily. 30 Tablet 1    carvediloL (COREG) 12.5 mg tablet Take 1 Tablet by mouth two (2) times a day. 180 Tablet 1    buprenorphine-naloxone (SUBOXONE) 8-2 mg film sublingaul film DISSOLVE UP TO 2 FILMS UNDER TONGUE EVERY DAY      fluconazole (DIFLUCAN) 200 mg tablet Take 400 mg by mouth daily. pantoprazole (PROTONIX) 40 mg tablet Take 40 mg by mouth daily.       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    Stroke [facility])     Patient had metastatic septic emboli including cerebral infarcts; right basal ganglia, right corona radiata, punctate infarcts, left corona radiata     Past Surgical History:   Procedure Laterality Date    HX AORTIC VALVE REPLACEMENT  09/30/2021     Family History   Problem Relation Age of Onset    Bipolar Disorder Mother     Hypertension Father      Social History     Socioeconomic History    Marital status: SINGLE     Spouse name: Not on file    Number of children: Not on file    Years of education: Not on file    Highest education level: Not on file   Occupational History    Not on file   Tobacco Use    Smoking status: Never    Smokeless tobacco: Never   Vaping Use    Vaping Use: Never used   Substance and Sexual Activity    Alcohol use: Not Currently     Comment: once a month    Drug use: Not Currently    Sexual activity: Not on file   Other Topics Concern    Not on file   Social History Narrative    Not on file     Social Determinants of Health     Financial Resource Strain: Not on file   Food Insecurity: Not on file   Transportation Needs: Not on file   Physical Activity: Insufficiently Active    Days of Exercise per Week: 1 day    Minutes of Exercise per Session: 10 min   Stress: Not on file   Social Connections: Not on file   Intimate Partner Violence: Not At Risk    Fear of Current or Ex-Partner: No    Emotionally Abused: No    Physically Abused: No    Sexually Abused: No   Housing Stability: Not on file       Patient is a 43-year-old male who presents the office today for INR check. Patient's INR is mildly low at 1.8.   Patient denies any extra or missed dosages, change in diet, abnormal bleeding or any other acute concerns. He states he has had this occur in the past where his INR will briefly elevate and then normalize. He is currently on warfarin 2.5 milligrams daily except for a skipping Saturday dosages. ROS:   Review of Systems   Constitutional:  Negative for chills and fever. HENT:  Negative for congestion. Respiratory:  Negative for cough and shortness of breath. Cardiovascular:  Negative for chest pain and leg swelling. Gastrointestinal:  Negative for abdominal pain, nausea and vomiting. Neurological:  Negative for dizziness, tingling, weakness and headaches. Objective:   Visit Vitals  /78 (BP 1 Location: Right upper arm, BP Patient Position: Sitting, BP Cuff Size: Adult)   Pulse 75   Temp 97.4 °F (36.3 °C) (Skin)   Resp 16   Ht 6' 3\" (1.905 m)   Wt 340 lb (154.2 kg)   SpO2 98%   BMI 42.50 kg/m²         Vitals and Nurse Documentation reviewed. Physical Exam  Constitutional:       General: He is not in acute distress. Appearance: Normal appearance. He is obese. He is not toxic-appearing. HENT:      Head: Normocephalic and atraumatic. Eyes:      Conjunctiva/sclera: Conjunctivae normal.   Cardiovascular:      Rate and Rhythm: Normal rate and regular rhythm. Pulses: Normal pulses. Heart sounds: No murmur heard. No gallop. Comments: Systolic click appreciated on right and left sternal borders  Pulmonary:      Effort: Pulmonary effort is normal. No respiratory distress. Breath sounds: Normal breath sounds. No wheezing or rhonchi. Abdominal:      General: Bowel sounds are normal. There is no distension. Palpations: Abdomen is soft. Tenderness: There is no abdominal tenderness. There is no guarding. Musculoskeletal:      Right lower leg: No edema. Left lower leg: No edema. Neurological:      Mental Status: He is alert and oriented to person, place, and time.       Gait: Gait normal.     Results for orders placed or performed in visit on 02/03/23   AMB POC PT/INR   Result Value Ref Range    VALID INTERNAL CONTROL POC Yes     Prothrombin time (POC) 21.4 seconds    INR POC 1.8

## 2023-02-03 NOTE — PROGRESS NOTES
1. Have you been to the ER, urgent care clinic since your last visit? Hospitalized since your last visit? No    2. Have you seen or consulted any other health care providers outside of the [address] [place] [address] since your last visit? Include any pap smears or colon screening.  No    Chief Complaint   Patient presents with    Anticoagulation     Health Maintenance Due   Topic Date Due    Hepatitis C Screening  Never done    DTaP/Tdap/Td series (2 - Td or Tdap) 04/28/2018    Flu Vaccine (1) Never done    COVID-19 Vaccine (2 - Pfizer series) 09/22/2022     Learning Assessment 4/4/2016   PRIMARY LEARNER Patient   HIGHEST LEVEL OF EDUCATION - PRIMARY LEARNER  GRADUATED HIGH SCHOOL OR GED   BARRIERS PRIMARY LEARNER NONE   CO-LEARNER CAREGIVER No   PRIMARY LANGUAGE ENGLISH   LEARNER PREFERENCE PRIMARY DEMONSTRATION   ANSWERED BY patient   RELATIONSHIP SELF     Visit Vitals  /78 (BP 1 Location: Right upper arm, BP Patient Position: Sitting, BP Cuff Size: Adult)   Pulse 75   Temp 97.4 °F (36.3 °C) (Skin)   Resp 16   Ht 6' 3\" (1.905 m)   Wt 340 lb (154.2 kg)   SpO2 98%   BMI 42.50 kg/m²

## 2023-03-10 ENCOUNTER — OFFICE VISIT (OUTPATIENT)
Dept: FAMILY MEDICINE CLINIC | Age: 28
End: 2023-03-10

## 2023-03-10 ENCOUNTER — CLINICAL SUPPORT (OUTPATIENT)
Dept: FAMILY MEDICINE CLINIC | Age: 28
End: 2023-03-10
Payer: MEDICAID

## 2023-03-10 VITALS
TEMPERATURE: 97.3 F | DIASTOLIC BLOOD PRESSURE: 86 MMHG | HEIGHT: 75 IN | HEART RATE: 97 BPM | BODY MASS INDEX: 39.17 KG/M2 | WEIGHT: 315 LBS | RESPIRATION RATE: 16 BRPM | OXYGEN SATURATION: 95 % | SYSTOLIC BLOOD PRESSURE: 122 MMHG

## 2023-03-10 VITALS
HEIGHT: 75 IN | HEART RATE: 97 BPM | TEMPERATURE: 97.3 F | BODY MASS INDEX: 39.17 KG/M2 | DIASTOLIC BLOOD PRESSURE: 86 MMHG | OXYGEN SATURATION: 95 % | SYSTOLIC BLOOD PRESSURE: 122 MMHG | RESPIRATION RATE: 16 BRPM | WEIGHT: 315 LBS

## 2023-03-10 DIAGNOSIS — R21 RASH AND NONSPECIFIC SKIN ERUPTION: ICD-10-CM

## 2023-03-10 DIAGNOSIS — Z79.01 CURRENT USE OF LONG TERM ANTICOAGULATION: Primary | ICD-10-CM

## 2023-03-10 DIAGNOSIS — Z95.2 HISTORY OF MECHANICAL AORTIC VALVE REPLACEMENT: ICD-10-CM

## 2023-03-10 LAB
INR BLD: 1.9
PT POC: 23 SECONDS
VALID INTERNAL CONTROL?: YES

## 2023-03-10 PROCEDURE — 85610 PROTHROMBIN TIME: CPT | Performed by: STUDENT IN AN ORGANIZED HEALTH CARE EDUCATION/TRAINING PROGRAM

## 2023-03-10 PROCEDURE — 99214 OFFICE O/P EST MOD 30 MIN: CPT | Performed by: STUDENT IN AN ORGANIZED HEALTH CARE EDUCATION/TRAINING PROGRAM

## 2023-03-10 NOTE — PROGRESS NOTES
1. Have you been to the ER, urgent care clinic since your last visit? Hospitalized since your last visit? No    2. Have you seen or consulted any other health care providers outside of the [address] [place] [address] since your last visit? Include any pap smears or colon screening. No    Chief Complaint   Patient presents with    Anticoagulation     Health Maintenance Due   Topic Date Due    Hepatitis C Screening  Never done    DTaP/Tdap/Td series (2 - Td or Tdap) 04/28/2018    Flu Vaccine (1) Never done    COVID-19 Vaccine (2 - Pfizer series) 09/22/2022     3 most recent PHQ Screens 3/10/2023   Little interest or pleasure in doing things Not at all   Feeling down, depressed, irritable, or hopeless Not at all   Total Score PHQ 2 0   Trouble falling or staying asleep, or sleeping too much Not at all   Feeling tired or having little energy Not at all   Poor appetite, weight loss, or overeating Not at all   Feeling bad about yourself - or that you are a failure or have let yourself or your family down Not at all   Trouble concentrating on things such as school, work, reading, or watching TV Not at all   Moving or speaking so slowly that other people could have noticed; or the opposite being so fidgety that others notice Not at all   Thoughts of being better off dead, or hurting yourself in some way Not at all   PHQ 9 Score 0   How difficult have these problems made it for you to do your work, take care of your home and get along with others Not difficult at all     Abuse Screening Questionnaire 3/10/2023   Do you ever feel afraid of your partner? N   Are you in a relationship with someone who physically or mentally threatens you? N   Is it safe for you to go home?  Y     Visit Vitals  /86 (BP 1 Location: Left upper arm, BP Patient Position: Sitting, BP Cuff Size: Adult)   Pulse 97   Temp 97.3 °F (36.3 °C) (Skin)   Resp 16   Ht 6' 3\" (1.905 m)   Wt 340 lb (154.2 kg)   SpO2 95%   BMI 42.50 kg/m²     Learning Assessment 4/4/2016   PRIMARY LEARNER Patient   HIGHEST LEVEL OF EDUCATION - PRIMARY LEARNER  GRADUATED HIGH SCHOOL OR GED   BARRIERS PRIMARY LEARNER NONE   CO-LEARNER CAREGIVER No   PRIMARY LANGUAGE ENGLISH   LEARNER PREFERENCE PRIMARY DEMONSTRATION   ANSWERED BY patient   RELATIONSHIP SELF

## 2023-03-10 NOTE — PROGRESS NOTES
1. Have you been to the ER, urgent care clinic since your last visit? Hospitalized since your last visit? No    2. Have you seen or consulted any other health care providers outside of the [address] [place] [address] since your last visit? Include any pap smears or colon screening. No    Chief Complaint   Patient presents with    Anticoagulation     Health Maintenance Due   Topic Date Due    Hepatitis C Screening  Never done    DTaP/Tdap/Td series (2 - Td or Tdap) 04/28/2018    Flu Vaccine (1) Never done    COVID-19 Vaccine (2 - Pfizer series) 09/22/2022     Learning Assessment 4/4/2016   PRIMARY LEARNER Patient   HIGHEST LEVEL OF EDUCATION - PRIMARY LEARNER  GRADUATED HIGH SCHOOL OR GED   BARRIERS PRIMARY LEARNER NONE   CO-LEARNER CAREGIVER No   PRIMARY LANGUAGE ENGLISH   LEARNER PREFERENCE PRIMARY DEMONSTRATION   ANSWERED BY patient   RELATIONSHIP SELF     Rash located on b/l arms started on 3/9/2023. Patient denies any sob, itch and pain at this time. Provider notified.        Visit Vitals  /86 (BP 1 Location: Left upper arm, BP Patient Position: Sitting, BP Cuff Size: Adult)   Pulse 97   Temp 97.3 °F (36.3 °C) (Skin)   Resp 16   Ht 6' 3\" (1.905 m)   Wt 340 lb (154.2 kg)   SpO2 95%   BMI 42.50 kg/m²

## 2023-03-10 NOTE — PROGRESS NOTES
Assessment/Plan:     Diagnoses and all orders for this visit:    1. Current use of long term anticoagulation  -Patient has a history of an aortic mechanical valve on Coumadin for anticoagulation  -INR in office today slightly low at 1.9. INR goal 2-3  -Increase current Coumadin dosage from 2.5 mg all days to now 2.5 mg daily on all days except Sat. when he will take 5 mg  -Repeat INR in 1-2 weeks, advise in office visit     2. History of mechanical aortic valve replacement  -Has an On-X mechanical aortic valve   -Continue anticoagulation. See plan above     3. Rash and nonspecific skin eruption  -Rash consistent with hives noted on bilateral upper extremities  -Advised patient to monitor for any possible allergen exposure  -Patient has not tolerated prednisone in the past therefore will treat symptomatically with Benadryl as needed  -Return precautions discussed    Follow-up and Dispositions    Return in about 1 week (around 3/17/2023), or if symptoms worsen or fail to improve. Discussed expected course/resolution/complications of diagnosis in detail with patient. Medication risks/benefits/costs/interactions/alternatives discussed with patient. Pt expressed understanding with the diagnosis and plan    Subjective:      Luke Delgado is a 34 y.o. male who presents for had concerns including Anticoagulation. Current Outpatient Medications   Medication Sig Dispense Refill    sertraline (ZOLOFT) 100 mg tablet TAKE 1 TABLET BY MOUTH DAILY 30 Tablet 0    FeroSuL 325 mg (65 mg iron) tablet TAKE 1 TABLET BY MOUTH TWICE DAILY 60 Tablet 2    traZODone (DESYREL) 50 mg tablet Take 1 Tablet by mouth nightly. 90 Tablet 1    carvediloL (COREG) 12.5 mg tablet Take 1 Tablet by mouth two (2) times a day. 180 Tablet 1    buprenorphine-naloxone (SUBOXONE) 8-2 mg film sublingaul film DISSOLVE UP TO 2 FILMS UNDER TONGUE EVERY DAY      fluconazole (DIFLUCAN) 200 mg tablet Take 400 mg by mouth daily.       pantoprazole (PROTONIX) 40 mg tablet Take 40 mg by mouth daily.       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    Stroke [facility])     Patient had metastatic septic emboli including cerebral infarcts; right basal ganglia, right corona radiata, punctate infarcts, left corona radiata     Past Surgical History:   Procedure Laterality Date    HX AORTIC VALVE REPLACEMENT  09/30/2021     Family History   Problem Relation Age of Onset    Bipolar Disorder Mother     Hypertension Father      Social History     Socioeconomic History    Marital status: SINGLE     Spouse name: Not on file    Number of children: Not on file    Years of education: Not on file    Highest education level: Not on file   Occupational History    Not on file   Tobacco Use    Smoking status: Never    Smokeless tobacco: Never   Vaping Use    Vaping Use: Never used   Substance and Sexual Activity    Alcohol use: Not Currently     Comment: once a month    Drug use: Not Currently    Sexual activity: Not on file   Other Topics Concern    Not on file   Social History Narrative    Not on file     Social Determinants of Health     Financial Resource Strain: Not on file   Food Insecurity: Not on file   Transportation Needs: Not on file   Physical Activity: Insufficiently Active    Days of Exercise per Week: 1 day    Minutes of Exercise per Session: 10 min   Stress: Not on file   Social Connections: Not on file   Intimate Partner Violence: Not At Risk    Fear of Current or Ex-Partner: No Emotionally Abused: No    Physically Abused: No    Sexually Abused: No   Housing Stability: Not on file       Patient is a 16-year-old male who presents the office today for INR check. INR is below goal at 1.9. INR goal of 2-3 due to artificial aortic X valve. Patient denies any changes in his diet, abnormal bleeding, and states he has been taking his medication as indicated. Also of note patient states that he developed a rash last night on bilateral upper extremities. He states it started on his right forearm and then has spread. He denies any itching or known exposure to an allergen. Rash consistent with hives. Patient does state that it causes no discomfort, denies any shortness of breath or swelling of the throat. Patient also states he has had adverse reactions to steroids in the past and would like to avoid this. ROS:   Review of Systems   Constitutional:  Negative for chills and fever. HENT:  Negative for congestion. Respiratory:  Negative for cough and shortness of breath. Cardiovascular:  Negative for chest pain and leg swelling. Gastrointestinal:  Negative for abdominal pain, nausea and vomiting. Skin:  Positive for rash. Negative for itching. Neurological:  Negative for dizziness, tingling, weakness and headaches. Objective:   Visit Vitals  /86 (BP 1 Location: Left upper arm, BP Patient Position: Sitting, BP Cuff Size: Adult)   Pulse 97   Temp 97.3 °F (36.3 °C) (Skin)   Resp 16   Ht 6' 3\" (1.905 m)   Wt 340 lb (154.2 kg)   SpO2 95%   BMI 42.50 kg/m²         Vitals and Nurse Documentation reviewed. Physical Exam  Constitutional:       General: He is not in acute distress. Appearance: Normal appearance. He is obese. He is not toxic-appearing. HENT:      Head: Normocephalic and atraumatic. Nose: Nose normal.      Mouth/Throat:      Mouth: Mucous membranes are moist.      Pharynx: Oropharynx is clear.  No oropharyngeal exudate or posterior oropharyngeal erythema. Eyes:      Conjunctiva/sclera: Conjunctivae normal.   Cardiovascular:      Rate and Rhythm: Normal rate and regular rhythm. Pulses: Normal pulses. Heart sounds: No murmur heard. No gallop. Comments: Systolic click appreciated on right and left sternal borders  Pulmonary:      Effort: Pulmonary effort is normal. No respiratory distress. Breath sounds: Normal breath sounds. No wheezing or rhonchi. Abdominal:      General: Bowel sounds are normal. There is no distension. Palpations: Abdomen is soft. Tenderness: There is no abdominal tenderness. There is no guarding. Musculoskeletal:      Right lower leg: No edema. Left lower leg: No edema. Skin:     Comments: Urticaria noted diffusely on bilateral upper extremities   Neurological:      Mental Status: He is alert and oriented to person, place, and time.       Gait: Gait normal.       Results for orders placed or performed in visit on 03/10/23   AMB POC PT/INR   Result Value Ref Range    VALID INTERNAL CONTROL POC Yes     Prothrombin time (POC) 23.0 seconds    INR POC 1.9

## 2023-04-03 ENCOUNTER — OFFICE VISIT (OUTPATIENT)
Dept: FAMILY MEDICINE CLINIC | Age: 28
End: 2023-04-03
Payer: MEDICAID

## 2023-04-03 DIAGNOSIS — D50.9 IRON DEFICIENCY ANEMIA, UNSPECIFIED IRON DEFICIENCY ANEMIA TYPE: ICD-10-CM

## 2023-04-03 DIAGNOSIS — Z95.2 HISTORY OF MECHANICAL AORTIC VALVE REPLACEMENT: ICD-10-CM

## 2023-04-03 DIAGNOSIS — Z13.220 LIPID SCREENING: ICD-10-CM

## 2023-04-03 DIAGNOSIS — G47.09 OTHER INSOMNIA: ICD-10-CM

## 2023-04-03 DIAGNOSIS — Z13.1 DIABETES MELLITUS SCREENING: ICD-10-CM

## 2023-04-03 DIAGNOSIS — F32.9 MAJOR DEPRESSIVE DISORDER WITH CURRENT ACTIVE EPISODE, UNSPECIFIED DEPRESSION EPISODE SEVERITY, UNSPECIFIED WHETHER RECURRENT: ICD-10-CM

## 2023-04-03 DIAGNOSIS — Z79.01 CURRENT USE OF LONG TERM ANTICOAGULATION: Primary | ICD-10-CM

## 2023-04-03 LAB
INR BLD: 3.5
PT POC: 41.7 SECONDS
VALID INTERNAL CONTROL?: YES

## 2023-04-03 PROCEDURE — 85610 PROTHROMBIN TIME: CPT | Performed by: STUDENT IN AN ORGANIZED HEALTH CARE EDUCATION/TRAINING PROGRAM

## 2023-04-03 PROCEDURE — 99214 OFFICE O/P EST MOD 30 MIN: CPT | Performed by: STUDENT IN AN ORGANIZED HEALTH CARE EDUCATION/TRAINING PROGRAM

## 2023-04-03 RX ORDER — SERTRALINE HYDROCHLORIDE 100 MG/1
100 TABLET, FILM COATED ORAL DAILY
Qty: 90 TABLET | Refills: 1 | Status: SHIPPED | OUTPATIENT
Start: 2023-04-03

## 2023-04-03 NOTE — PROGRESS NOTES
Assessment/Plan:     Diagnoses and all orders for this visit:    1. Current use of long term anticoagulation  -     AMB POC PT/INR  -Patient has a history of an aortic mechanical valve on Coumadin for anticoagulation  -INR in office today slightly high at 3.5. INR goal 2-3  -Decrease current Coumadin dosage from 2.5 mg all days except for 5 mg on Sat to now 2.5 mg on all days  -Repeat INR in 2 weeks    2. History of mechanical aortic valve replacement  -     CBC W/O DIFF; Future  -     METABOLIC PANEL, COMPREHENSIVE; Future  -Has an On-X mechanical aortic valve   -Continue anticoagulation. See plan above     3. Major depressive disorder with current active episode, unspecified depression episode severity, unspecified whether recurrent  -     sertraline (ZOLOFT) 100 mg tablet; Take 1 Tablet by mouth daily.  -Chronic. Stable. PHQ-9 score of 10. Denies SI.  -Patient does not desire to change medication dosages or make adjustments  -Continue on sertraline 10 mg daily and trazodone 50 mg at bedtime    4. Other insomnia  -Chronic. Improved and stable. -Continue on trazodone 50 mg at bedtime    5. Iron deficiency anemia, unspecified iron deficiency anemia type  -     CBC W/O DIFF; Future  -     IRON PROFILE; Future  -Patient currently on oral iron supplementation  -Due for repeat lab work. 6. Lipid screening  -     LIPID PANEL; Future    7. Diabetes mellitus screening  -     HEMOGLOBIN A1C WITH EAG; Future      Follow-up and Dispositions    Return in about 2 weeks (around 4/17/2023), or if symptoms worsen or fail to improve. Discussed expected course/resolution/complications of diagnosis in detail with patient. Medication risks/benefits/costs/interactions/alternatives discussed with patient. Pt expressed understanding with the diagnosis and plan.          Subjective:      Herson Abad is a 34 y.o. male who presents for had concerns including Follow-up (X2 week; depression & Insomnia) and Other (INR check). Current Outpatient Medications   Medication Sig Dispense Refill    sertraline (ZOLOFT) 100 mg tablet Take 1 Tablet by mouth daily. 90 Tablet 1    FeroSuL 325 mg (65 mg iron) tablet TAKE 1 TABLET BY MOUTH TWICE DAILY 60 Tablet 2    traZODone (DESYREL) 50 mg tablet Take 1 Tablet by mouth nightly. 90 Tablet 1    carvediloL (COREG) 12.5 mg tablet Take 1 Tablet by mouth two (2) times a day. 180 Tablet 1    buprenorphine-naloxone (SUBOXONE) 8-2 mg film sublingaul film DISSOLVE UP TO 2 FILMS UNDER TONGUE EVERY DAY      fluconazole (DIFLUCAN) 200 mg tablet Take 400 mg by mouth daily. pantoprazole (PROTONIX) 40 mg tablet Take 40 mg by mouth daily.       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    Stroke [facility])     Patient had metastatic septic emboli including cerebral infarcts; right basal ganglia, right corona radiata, punctate infarcts, left corona radiata     Past Surgical History:   Procedure Laterality Date    HX AORTIC VALVE REPLACEMENT  09/30/2021     Family History   Problem Relation Age of Onset    Bipolar Disorder Mother     Hypertension Father      Social History     Socioeconomic History    Marital status: SINGLE     Spouse name: Not on file    Number of children: Not on file    Years of education: Not on file    Highest education level: Not on file   Occupational History    Not on file   Tobacco Use    Smoking status: Never    Smokeless tobacco: Never   Vaping Use    Vaping Use: Never used   Substance and Sexual Activity    Alcohol use: Not Currently     Comment: once a month    Drug use: Not Currently    Sexual activity: Not on file   Other Topics Concern    Not on file   Social History Narrative    Not on file     Social Determinants of Health     Financial Resource Strain: Not on file   Food Insecurity: Not on file   Transportation Needs: Not on file   Physical Activity: Insufficiently Active    Days of Exercise per Week: 1 day    Minutes of Exercise per Session: 10 min   Stress: Not on file   Social Connections: Not on file   Intimate Partner Violence: Not At Risk    Fear of Current or Ex-Partner: No    Emotionally Abused: No    Physically Abused: No    Sexually Abused: No   Housing Stability: Not on file       Patient is a 32-year-old male who presents to the office today for follow-up of depression, insomnia and an INR check. Patient is currently on sertraline 100 mg daily and trazodone 50 mg at night. He states that his depression is currently stable and would not like to adjust the medication dosage. He denies any SI. He has noted improvement on the medication. He also states he is sleeping well on the current dose of trazodone. He denies any side effects. He is due for routine lab work. He did just recently see his neurologist a couple of weeks ago. Patient also has a slightly elevated INR today. INR goal of 2-3 and INR today 3.5. He denies any abnormal bleeding, change in what he has been eating or missed or added dosages. Patient denies any chest pain, shortness of breath, dizziness, palpitations or abdominal pain. ROS:   Review of Systems   Constitutional:  Negative for chills and fever. HENT:  Negative for congestion. Respiratory:  Negative for cough and shortness of breath. Cardiovascular:  Negative for chest pain and leg swelling. Gastrointestinal:  Negative for abdominal pain, nausea and vomiting. Neurological:  Negative for dizziness, tingling, weakness and headaches. Objective:   Visit Vitals  /82 (BP 1 Location: Left upper arm, BP Patient Position: Sitting, BP Cuff Size: Adult long)   Pulse 66   Temp 97 °F (36.1 °C) (Temporal)   Resp 16   Ht 6' 3\" (1.905 m)   Wt 346 lb (156.9 kg)   SpO2 98%   BMI 43.25 kg/m²         Vitals and Nurse Documentation reviewed. Physical Exam  Constitutional:       General: He is not in acute distress. Appearance: Normal appearance. He is obese. He is not toxic-appearing. HENT:      Head: Normocephalic and atraumatic. Eyes:      Conjunctiva/sclera: Conjunctivae normal.   Cardiovascular:      Rate and Rhythm: Normal rate and regular rhythm. Pulses: Normal pulses. Heart sounds: No murmur heard. No gallop. Comments: Systolic click appreciated on right and left sternal borders  Pulmonary:      Effort: Pulmonary effort is normal. No respiratory distress. Breath sounds: Normal breath sounds. No wheezing or rhonchi. Abdominal:      General: Bowel sounds are normal. There is no distension. Palpations: Abdomen is soft. Tenderness: There is no abdominal tenderness. There is no guarding. Musculoskeletal:      Right lower leg: No edema. Left lower leg: No edema. Neurological:      Mental Status: He is alert and oriented to person, place, and time. Gait: Gait normal.   Psychiatric:         Attention and Perception: Attention normal.         Mood and Affect: Mood and affect normal.         Speech: Speech normal.         Behavior: Behavior normal. Behavior is cooperative. Thought Content: Thought content normal. Thought content does not include suicidal ideation.        Results for orders placed or performed in visit on 04/03/23   AMB POC PT/INR   Result Value Ref Range    VALID INTERNAL CONTROL POC Yes     Prothrombin time (POC) 41.7 seconds    INR POC 3.5

## 2023-04-04 LAB
ALBUMIN SERPL-MCNC: 4.4 G/DL (ref 4.1–5.2)
ALBUMIN/GLOB SERPL: 1.4 {RATIO} (ref 1.2–2.2)
ALP SERPL-CCNC: 101 IU/L (ref 44–121)
ALT SERPL-CCNC: 21 IU/L (ref 0–44)
AST SERPL-CCNC: 24 IU/L (ref 0–40)
BILIRUB SERPL-MCNC: 0.3 MG/DL (ref 0–1.2)
BUN SERPL-MCNC: 17 MG/DL (ref 6–20)
BUN/CREAT SERPL: 16 (ref 9–20)
CALCIUM SERPL-MCNC: 9.6 MG/DL (ref 8.7–10.2)
CHLORIDE SERPL-SCNC: 102 MMOL/L (ref 96–106)
CHOLEST SERPL-MCNC: 224 MG/DL (ref 100–199)
CO2 SERPL-SCNC: 18 MMOL/L (ref 20–29)
CREAT SERPL-MCNC: 1.06 MG/DL (ref 0.76–1.27)
EGFRCR SERPLBLD CKD-EPI 2021: 99 ML/MIN/1.73
ERYTHROCYTE [DISTWIDTH] IN BLOOD BY AUTOMATED COUNT: 15.7 % (ref 11.6–15.4)
EST. AVERAGE GLUCOSE BLD GHB EST-MCNC: 105 MG/DL
GLOBULIN SER CALC-MCNC: 3.1 G/DL (ref 1.5–4.5)
GLUCOSE SERPL-MCNC: 99 MG/DL (ref 70–99)
HBA1C MFR BLD: 5.3 % (ref 4.8–5.6)
HCT VFR BLD AUTO: 38.9 % (ref 37.5–51)
HDLC SERPL-MCNC: 21 MG/DL
HGB BLD-MCNC: 12.6 G/DL (ref 13–17.7)
IRON SATN MFR SERPL: 18 % (ref 15–55)
IRON SERPL-MCNC: 45 UG/DL (ref 38–169)
LDLC SERPL CALC-MCNC: 128 MG/DL (ref 0–99)
MCH RBC QN AUTO: 26.5 PG (ref 26.6–33)
MCHC RBC AUTO-ENTMCNC: 32.4 G/DL (ref 31.5–35.7)
MCV RBC AUTO: 82 FL (ref 79–97)
PLATELET # BLD AUTO: 248 X10E3/UL (ref 150–450)
POTASSIUM SERPL-SCNC: 4.2 MMOL/L (ref 3.5–5.2)
PROT SERPL-MCNC: 7.5 G/DL (ref 6–8.5)
RBC # BLD AUTO: 4.76 X10E6/UL (ref 4.14–5.8)
SODIUM SERPL-SCNC: 138 MMOL/L (ref 134–144)
TIBC SERPL-MCNC: 256 UG/DL (ref 250–450)
TRIGL SERPL-MCNC: 417 MG/DL (ref 0–149)
UIBC SERPL-MCNC: 211 UG/DL (ref 111–343)
VLDLC SERPL CALC-MCNC: 75 MG/DL (ref 5–40)
WBC # BLD AUTO: 5.4 X10E3/UL (ref 3.4–10.8)

## 2023-04-17 ENCOUNTER — OFFICE VISIT (OUTPATIENT)
Dept: FAMILY MEDICINE CLINIC | Age: 28
End: 2023-04-17

## 2023-04-17 VITALS
BODY MASS INDEX: 39.17 KG/M2 | HEART RATE: 82 BPM | SYSTOLIC BLOOD PRESSURE: 114 MMHG | WEIGHT: 315 LBS | HEIGHT: 75 IN | RESPIRATION RATE: 16 BRPM | DIASTOLIC BLOOD PRESSURE: 75 MMHG | TEMPERATURE: 97.1 F | OXYGEN SATURATION: 97 %

## 2023-04-17 DIAGNOSIS — E78.2 MIXED HYPERLIPIDEMIA: ICD-10-CM

## 2023-04-17 DIAGNOSIS — F32.9 MAJOR DEPRESSIVE DISORDER WITH CURRENT ACTIVE EPISODE, UNSPECIFIED DEPRESSION EPISODE SEVERITY, UNSPECIFIED WHETHER RECURRENT: ICD-10-CM

## 2023-04-17 DIAGNOSIS — Z79.01 CURRENT USE OF LONG TERM ANTICOAGULATION: Primary | ICD-10-CM

## 2023-04-17 DIAGNOSIS — Z95.2 HISTORY OF MECHANICAL AORTIC VALVE REPLACEMENT: ICD-10-CM

## 2023-04-17 PROCEDURE — 85610 PROTHROMBIN TIME: CPT | Performed by: STUDENT IN AN ORGANIZED HEALTH CARE EDUCATION/TRAINING PROGRAM

## 2023-04-17 PROCEDURE — 99214 OFFICE O/P EST MOD 30 MIN: CPT | Performed by: STUDENT IN AN ORGANIZED HEALTH CARE EDUCATION/TRAINING PROGRAM

## 2023-04-17 RX ORDER — SERTRALINE HYDROCHLORIDE 100 MG/1
100 TABLET, FILM COATED ORAL DAILY
Qty: 90 TABLET | Refills: 1 | Status: SHIPPED | OUTPATIENT
Start: 2023-04-17

## 2023-04-17 RX ORDER — ATORVASTATIN CALCIUM 10 MG/1
10 TABLET, FILM COATED ORAL DAILY
Qty: 90 TABLET | Refills: 1 | Status: SHIPPED | OUTPATIENT
Start: 2023-04-17

## 2023-04-17 NOTE — PROGRESS NOTES
Assessment/Plan:     Diagnoses and all orders for this visit:    1. Current use of long term anticoagulation  -Patient has a history of an aortic mechanical valve on Coumadin for anticoagulation  -INR in office today slightly high at 4.2. INR goal 2-3  -Decrease current Coumadin dosage from 2.5 mg all days to now 2.5 mg on all days except Tues, when he will skip the dose  -Repeat INR in 1 week    2. History of mechanical aortic valve replacement  -Has an On-X mechanical aortic valve   -Continue anticoagulation. See plan above     3. Mixed hyperlipidemia  -     atorvastatin (LIPITOR) 10 mg tablet; Take 1 Tablet by mouth daily.  -Chronic. Elevated LDL, total and triglycerides on recent lipid panel  -Given previous history of stroke will place patient on a statin  -Start atorvastatin 10 mg daily. -Repeat lipid panel in 3 months    4. Major depressive disorder with current active episode, unspecified depression episode severity, unspecified whether recurrent  -     sertraline (ZOLOFT) 100 mg tablet; Take 1 Tablet by mouth daily.  -Chronic. Patient previously evaluated last week for this and was prescribed sertraline 100 mg daily  -Patient states medication was not at pharmacy. Refilled medication today. Follow-up and Dispositions    Return in about 1 week (around 4/24/2023), or if symptoms worsen or fail to improve. Discussed expected course/resolution/complications of diagnosis in detail with patient. Medication risks/benefits/costs/interactions/alternatives discussed with patient. Pt expressed understanding with the diagnosis and plan    Subjective:      Rochelle Rosas is a 34 y.o. male who presents for had concerns including Anticoagulation (Elevated INR). Current Outpatient Medications   Medication Sig Dispense Refill    sertraline (ZOLOFT) 100 mg tablet Take 1 Tablet by mouth daily. 90 Tablet 1    atorvastatin (LIPITOR) 10 mg tablet Take 1 Tablet by mouth daily.  90 Tablet 1    FeroSuL 325 mg (65 mg iron) tablet TAKE 1 TABLET BY MOUTH TWICE DAILY 60 Tablet 2    traZODone (DESYREL) 50 mg tablet Take 1 Tablet by mouth nightly. 90 Tablet 1    carvediloL (COREG) 12.5 mg tablet Take 1 Tablet by mouth two (2) times a day. 180 Tablet 1    buprenorphine-naloxone (SUBOXONE) 8-2 mg film sublingaul film DISSOLVE UP TO 2 FILMS UNDER TONGUE EVERY DAY      fluconazole (DIFLUCAN) 200 mg tablet Take 2 Tablets by mouth daily. pantoprazole (PROTONIX) 40 mg tablet Take 1 Tablet by mouth daily.       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)     Patient had metastatic septic emboli including cerebral infarcts; right basal ganglia, right corona radiata, punctate infarcts, left corona radiata     Past Surgical History:   Procedure Laterality Date    HX AORTIC VALVE REPLACEMENT  09/30/2021     Family History   Problem Relation Age of Onset    Bipolar Disorder Mother     Hypertension Father      Social History     Socioeconomic History    Marital status: SINGLE     Spouse name: Not on file    Number of children: Not on file    Years of education: Not on file    Highest education level: Not on file   Occupational History    Not on file   Tobacco Use    Smoking status: Never    Smokeless tobacco: Never   Vaping Use    Vaping Use: Never used   Substance and Sexual Activity    Alcohol use: Not Currently     Comment: once a month    Drug use: Not Currently    Sexual activity: Not on file   Other Topics Concern    Not on file   Social History Narrative    Not on file     Social Determinants of Health     Financial Resource Strain: Not on file   Food Insecurity: Not on file   Transportation Needs: Not on file   Physical Activity: Insufficiently Active    Days of Exercise per Week: 1 day    Minutes of Exercise per Session: 10 min   Stress: Not on file   Social Connections: Not on file   Intimate Partner Violence: Not At Risk    Fear of Current or Ex-Partner: No    Emotionally Abused: No    Physically Abused: No    Sexually Abused: No   Housing Stability: Not on file       Patient is a 32-year-old male who presents the office today for follow-up INR check. Patient has a mechanical heart valve and requires an INR of 2-3. His INR today was slightly elevated at 4.2. Patient has been changing his diet which may have affected his INR this week. Patient denies any chest pain, shortness of breath, dizziness, abnormal bleeding, lower extremity edema or any other acute concerns. Of note his recent cholesterol was noted to be elevated. He does have a previous history of a stroke due to fungal infection. He had been on atorvastatin and tolerated in the past.        ROS:   Review of Systems   Constitutional:  Negative for chills and fever. HENT:  Negative for congestion. Respiratory:  Negative for cough and shortness of breath. Cardiovascular:  Negative for chest pain and leg swelling. Gastrointestinal:  Negative for abdominal pain, nausea and vomiting. Neurological:  Negative for dizziness, tingling, weakness and headaches. Objective:   Visit Vitals  /75 (BP 1 Location: Left upper arm, BP Patient Position: Sitting, BP Cuff Size: Large adult)   Pulse 82   Temp 97.1 °F (36.2 °C) (Temporal)   Resp 16   Ht 6' 3\" (1.905 m)   Wt 341 lb (154.7 kg)   SpO2 97%   BMI 42.62 kg/m²         Vitals and Nurse Documentation reviewed. Physical Exam  Constitutional:       General: He is not in acute distress.      Appearance: Normal appearance. He is obese. He is not toxic-appearing. HENT:      Head: Normocephalic and atraumatic. Eyes:      Conjunctiva/sclera: Conjunctivae normal.   Cardiovascular:      Rate and Rhythm: Normal rate and regular rhythm. Pulses: Normal pulses. Heart sounds: No murmur heard. No gallop. Comments: Systolic click appreciated on right and left sternal borders  Pulmonary:      Effort: Pulmonary effort is normal. No respiratory distress. Breath sounds: Normal breath sounds. No wheezing or rhonchi. Abdominal:      General: Bowel sounds are normal. There is no distension. Palpations: Abdomen is soft. Tenderness: There is no abdominal tenderness. There is no guarding. Musculoskeletal:      Right lower leg: No edema. Left lower leg: No edema. Neurological:      Mental Status: He is alert and oriented to person, place, and time.        Results for orders placed or performed in visit on 04/17/23   AMB POC PT/INR   Result Value Ref Range    VALID INTERNAL CONTROL POC Yes     Prothrombin time (POC) 30.1 seconds    INR POC 4.2

## 2023-04-25 ENCOUNTER — CLINICAL SUPPORT (OUTPATIENT)
Dept: FAMILY MEDICINE CLINIC | Age: 28
End: 2023-04-25
Payer: MEDICAID

## 2023-04-25 VITALS
HEIGHT: 75 IN | DIASTOLIC BLOOD PRESSURE: 76 MMHG | WEIGHT: 315 LBS | TEMPERATURE: 97.2 F | SYSTOLIC BLOOD PRESSURE: 120 MMHG | RESPIRATION RATE: 16 BRPM | OXYGEN SATURATION: 98 % | HEART RATE: 77 BPM | BODY MASS INDEX: 39.17 KG/M2

## 2023-04-25 DIAGNOSIS — Z79.01 CURRENT USE OF LONG TERM ANTICOAGULATION: Primary | ICD-10-CM

## 2023-04-25 LAB
INR BLD: 3
PT POC: 36.3 SECONDS
VALID INTERNAL CONTROL?: YES

## 2023-04-25 PROCEDURE — 85610 PROTHROMBIN TIME: CPT | Performed by: STUDENT IN AN ORGANIZED HEALTH CARE EDUCATION/TRAINING PROGRAM

## 2023-04-25 NOTE — PROGRESS NOTES
1. Have you been to the ER, urgent care clinic since your last visit? Hospitalized since your last visit? No    2. Have you seen or consulted any other health care providers outside of the [address] [place] [address] since your last visit? Include any pap smears or colon screening.  No    Chief Complaint   Patient presents with    Anticoagulation     Health Maintenance Due   Topic Date Due    Hepatitis C Screening  Never done    Varicella Vaccine (1 of 2 - 2-dose childhood series) Never done    DTaP/Tdap/Td series (2 - Td or Tdap) 04/28/2018    COVID-19 Vaccine (2 - Pfizer series) 09/22/2022     Learning Assessment 4/4/2016   PRIMARY LEARNER Patient   HIGHEST LEVEL OF EDUCATION - PRIMARY LEARNER  GRADUATED HIGH SCHOOL OR GED   BARRIERS PRIMARY LEARNER NONE   CO-LEARNER CAREGIVER No   PRIMARY LANGUAGE ENGLISH   LEARNER PREFERENCE PRIMARY DEMONSTRATION   ANSWERED BY patient   RELATIONSHIP SELF     Visit Vitals  /76 (BP 1 Location: Left upper arm, BP Patient Position: Sitting, BP Cuff Size: Adult)   Pulse 77   Temp 97.2 °F (36.2 °C) (Skin)   Resp 16   Ht 6' 3\" (1.905 m)   Wt 341 lb (154.7 kg)   SpO2 98%   BMI 42.62 kg/m²

## 2023-05-09 ENCOUNTER — NURSE ONLY (OUTPATIENT)
Facility: CLINIC | Age: 28
End: 2023-05-09
Payer: MEDICAID

## 2023-05-09 VITALS
DIASTOLIC BLOOD PRESSURE: 79 MMHG | WEIGHT: 315 LBS | RESPIRATION RATE: 16 BRPM | OXYGEN SATURATION: 98 % | HEART RATE: 71 BPM | BODY MASS INDEX: 39.17 KG/M2 | TEMPERATURE: 98.2 F | SYSTOLIC BLOOD PRESSURE: 127 MMHG | HEIGHT: 75 IN

## 2023-05-09 DIAGNOSIS — Z79.01 LONG TERM (CURRENT) USE OF ANTICOAGULANTS: Primary | ICD-10-CM

## 2023-05-09 LAB
POC INR: 2.5
PROTHROMBIN TIME, POC: 29.6

## 2023-05-09 PROCEDURE — 85610 PROTHROMBIN TIME: CPT | Performed by: STUDENT IN AN ORGANIZED HEALTH CARE EDUCATION/TRAINING PROGRAM

## 2023-05-09 SDOH — ECONOMIC STABILITY: INCOME INSECURITY: HOW HARD IS IT FOR YOU TO PAY FOR THE VERY BASICS LIKE FOOD, HOUSING, MEDICAL CARE, AND HEATING?: NOT HARD AT ALL

## 2023-05-09 SDOH — ECONOMIC STABILITY: FOOD INSECURITY: WITHIN THE PAST 12 MONTHS, THE FOOD YOU BOUGHT JUST DIDN'T LAST AND YOU DIDN'T HAVE MONEY TO GET MORE.: NEVER TRUE

## 2023-05-09 SDOH — ECONOMIC STABILITY: FOOD INSECURITY: WITHIN THE PAST 12 MONTHS, YOU WORRIED THAT YOUR FOOD WOULD RUN OUT BEFORE YOU GOT MONEY TO BUY MORE.: NEVER TRUE

## 2023-05-09 SDOH — ECONOMIC STABILITY: HOUSING INSECURITY
IN THE LAST 12 MONTHS, WAS THERE A TIME WHEN YOU DID NOT HAVE A STEADY PLACE TO SLEEP OR SLEPT IN A SHELTER (INCLUDING NOW)?: NO

## 2023-05-09 NOTE — PROGRESS NOTES
1. Have you been to the ER, urgent care clinic since your last visit? Hospitalized since your last visit? No    2. Have you seen or consulted any other health care providers outside of the [address] [place] [address] since your last visit? Include any pap smears or colon screening.  No    Chief Complaint   Patient presents with    Anticoagulation     Health Maintenance Due   Topic Date Due    Varicella vaccine (1 of 2 - 2-dose childhood series) Never done    HPV vaccine (1 - Male 2-dose series) Never done    HIV screen  Never done    Hepatitis C screen  Never done    DTaP/Tdap/Td vaccine (5 - Td or Tdap) 04/28/2018    COVID-19 Vaccine (2 - Pfizer series) 09/22/2022     Vitals:    05/09/23 0852   BP: 127/79   Pulse: 71   Resp: 16   Temp: 98.2 °F (36.8 °C)   SpO2: 98%

## 2023-06-19 RX ORDER — CARVEDILOL 12.5 MG/1
TABLET ORAL
Qty: 180 TABLET | Refills: 1 | Status: SHIPPED | OUTPATIENT
Start: 2023-06-19

## 2023-06-19 NOTE — TELEPHONE ENCOUNTER
Heath Hall MD  Needs appointment  Nurse Only on 06/09/2023   Component Date Value Ref Range Status    Prothrombin time, POC 06/09/2023 27.8   Final    POC INR 06/09/2023 2.3   Final     Health Maintenance Due   Topic Date Due    Varicella vaccine (1 of 2 - 2-dose childhood series) Never done    HPV vaccine (1 - Male 2-dose series) Never done    HIV screen  Never done    Hepatitis C screen  Never done    DTaP/Tdap/Td vaccine (5 - Td or Tdap) 04/28/2018    COVID-19 Vaccine (2 - Pfizer series) 10/27/2022

## 2023-07-14 ENCOUNTER — NURSE ONLY (OUTPATIENT)
Facility: CLINIC | Age: 28
End: 2023-07-14
Payer: MEDICAID

## 2023-07-14 VITALS
OXYGEN SATURATION: 96 % | TEMPERATURE: 98 F | BODY MASS INDEX: 39.17 KG/M2 | DIASTOLIC BLOOD PRESSURE: 78 MMHG | RESPIRATION RATE: 16 BRPM | WEIGHT: 315 LBS | HEIGHT: 75 IN | HEART RATE: 82 BPM | SYSTOLIC BLOOD PRESSURE: 111 MMHG

## 2023-07-14 DIAGNOSIS — Z79.01 LONG TERM (CURRENT) USE OF ANTICOAGULANTS: Primary | ICD-10-CM

## 2023-07-14 LAB
POC INR: 2.2
PROTHROMBIN TIME, POC: 26.5

## 2023-07-14 PROCEDURE — 85610 PROTHROMBIN TIME: CPT | Performed by: FAMILY MEDICINE

## 2023-07-14 NOTE — TELEPHONE ENCOUNTER
Zina Villegas MD  Upcoming appointment on 8/15/2023  Nurse Only on 07/14/2023   Component Date Value Ref Range Status    Prothrombin time, POC 07/14/2023 26.5   Final    POC INR 07/14/2023 2.2   Final     Health Maintenance Due   Topic Date Due    Varicella vaccine (1 of 2 - 2-dose childhood series) Never done    HPV vaccine (1 - Male 2-dose series) Never done    HIV screen  Never done    Hepatitis C screen  Never done    DTaP/Tdap/Td vaccine (5 - Td or Tdap) 04/28/2018    COVID-19 Vaccine (2 - Pfizer series) 10/27/2022

## 2023-07-15 RX ORDER — TRAZODONE HYDROCHLORIDE 50 MG/1
50 TABLET ORAL NIGHTLY
Qty: 90 TABLET | Refills: 0 | Status: SHIPPED | OUTPATIENT
Start: 2023-07-15

## 2023-07-15 RX ORDER — PANTOPRAZOLE SODIUM 40 MG/1
TABLET, DELAYED RELEASE ORAL DAILY
Qty: 30 TABLET | OUTPATIENT
Start: 2023-07-15

## 2023-08-22 ENCOUNTER — OFFICE VISIT (OUTPATIENT)
Facility: CLINIC | Age: 28
End: 2023-08-22
Payer: MEDICAID

## 2023-08-22 ENCOUNTER — NURSE ONLY (OUTPATIENT)
Facility: CLINIC | Age: 28
End: 2023-08-22
Payer: MEDICAID

## 2023-08-22 VITALS
OXYGEN SATURATION: 96 % | HEART RATE: 76 BPM | DIASTOLIC BLOOD PRESSURE: 77 MMHG | BODY MASS INDEX: 39.17 KG/M2 | RESPIRATION RATE: 18 BRPM | HEIGHT: 75 IN | TEMPERATURE: 97.2 F | WEIGHT: 315 LBS | SYSTOLIC BLOOD PRESSURE: 119 MMHG

## 2023-08-22 VITALS
BODY MASS INDEX: 36.45 KG/M2 | HEIGHT: 78 IN | OXYGEN SATURATION: 96 % | SYSTOLIC BLOOD PRESSURE: 119 MMHG | RESPIRATION RATE: 18 BRPM | TEMPERATURE: 97.2 F | WEIGHT: 315 LBS | HEART RATE: 77 BPM | DIASTOLIC BLOOD PRESSURE: 77 MMHG

## 2023-08-22 DIAGNOSIS — L03.90 CELLULITIS, UNSPECIFIED CELLULITIS SITE: ICD-10-CM

## 2023-08-22 DIAGNOSIS — Z79.01 LONG TERM (CURRENT) USE OF ANTICOAGULANTS: Primary | ICD-10-CM

## 2023-08-22 DIAGNOSIS — Z95.2 PRESENCE OF PROSTHETIC HEART VALVE: ICD-10-CM

## 2023-08-22 DIAGNOSIS — K21.9 GASTROESOPHAGEAL REFLUX DISEASE, UNSPECIFIED WHETHER ESOPHAGITIS PRESENT: ICD-10-CM

## 2023-08-22 LAB
POC INR: 4
PROTHROMBIN TIME, POC: 47.5

## 2023-08-22 PROCEDURE — 99214 OFFICE O/P EST MOD 30 MIN: CPT | Performed by: STUDENT IN AN ORGANIZED HEALTH CARE EDUCATION/TRAINING PROGRAM

## 2023-08-22 PROCEDURE — 85610 PROTHROMBIN TIME: CPT | Performed by: STUDENT IN AN ORGANIZED HEALTH CARE EDUCATION/TRAINING PROGRAM

## 2023-08-22 RX ORDER — PANTOPRAZOLE SODIUM 40 MG/1
40 TABLET, DELAYED RELEASE ORAL DAILY
Qty: 90 TABLET | Refills: 0 | Status: SHIPPED | OUTPATIENT
Start: 2023-08-22

## 2023-08-22 RX ORDER — CEPHALEXIN 500 MG/1
500 CAPSULE ORAL EVERY 8 HOURS
Qty: 21 CAPSULE | Refills: 0 | Status: SHIPPED | OUTPATIENT
Start: 2023-08-22 | End: 2023-08-29

## 2023-08-22 ASSESSMENT — ENCOUNTER SYMPTOMS
SHORTNESS OF BREATH: 0
RHINORRHEA: 0
COUGH: 0
VOMITING: 0
NAUSEA: 0
ABDOMINAL PAIN: 0

## 2023-08-22 NOTE — PROGRESS NOTES
Assessment/Plan:     Diagnoses and all orders for this visit:    Long term (current) use of anticoagulants  -Patient has a history of an aortic mechanical valve on Coumadin for anticoagulation  -INR in office today slightly high at 4.0. INR goal 2-3  -Decrease current Coumadin dosage from 2.5 mg all days except Tuesdays to now 2.5 mg on all days except Tues and Thur,  -Repeat INR in 1 week  -Also advised patient to notify his dentist of his elevated INR as this may delay any dental procedures. Presence of prosthetic heart valve  -Has an On-X mechanical aortic valve   -Continue anticoagulation. See plan above     Gastroesophageal reflux disease, unspecified whether esophagitis present  -     pantoprazole (PROTONIX) 40 MG tablet; Take 1 tablet by mouth daily  -Chronic. Stable. -Patient states he was recommended to continue on PPI. He states he has had EGDs in the past  -Will need to review records from gastroenterology  -For now continue pantoprazole daily    Cellulitis, unspecified cellulitis site  -     cephALEXin (KEFLEX) 500 MG capsule; Take 1 capsule by mouth in the morning and 1 capsule at noon and 1 capsule in the evening. Do all this for 7 days. -Mild swollen erythematous area on the third distal finger of his left hand  -Concern for possible cellulitis although uncertain  -Patient has already been noting improvement over the past week  -Therefore will treat with short course of Keflex to see if this provides benefit  -Return precautions discussed         Return in about 1 week (around 8/29/2023), or if symptoms worsen or fail to improve. Discussed expected course/resolution/complications of diagnosis in detail with patient. Medication risks/benefits/costs/interactions/alternatives discussed with patient. Pt expressed understanding with the diagnosis and plan      Subjective:      Shivani Greco is a 34 y.o. male who presents for had concerns including Coagulation Disorder (INR check).

## 2023-09-05 ENCOUNTER — NURSE ONLY (OUTPATIENT)
Facility: CLINIC | Age: 28
End: 2023-09-05
Payer: MEDICAID

## 2023-09-05 VITALS
HEART RATE: 63 BPM | DIASTOLIC BLOOD PRESSURE: 73 MMHG | BODY MASS INDEX: 36.45 KG/M2 | WEIGHT: 315 LBS | HEIGHT: 78 IN | SYSTOLIC BLOOD PRESSURE: 127 MMHG | OXYGEN SATURATION: 97 % | TEMPERATURE: 97.6 F | RESPIRATION RATE: 16 BRPM

## 2023-09-05 DIAGNOSIS — Z79.01 LONG TERM (CURRENT) USE OF ANTICOAGULANTS: Primary | ICD-10-CM

## 2023-09-05 LAB
POC INR: 2
PROTHROMBIN TIME, POC: 24

## 2023-09-05 PROCEDURE — 85610 PROTHROMBIN TIME: CPT | Performed by: STUDENT IN AN ORGANIZED HEALTH CARE EDUCATION/TRAINING PROGRAM

## 2023-09-20 ENCOUNTER — OFFICE VISIT (OUTPATIENT)
Facility: CLINIC | Age: 28
End: 2023-09-20

## 2023-09-20 VITALS
OXYGEN SATURATION: 98 % | RESPIRATION RATE: 16 BRPM | DIASTOLIC BLOOD PRESSURE: 77 MMHG | HEART RATE: 103 BPM | HEIGHT: 75 IN | SYSTOLIC BLOOD PRESSURE: 128 MMHG | WEIGHT: 315 LBS | BODY MASS INDEX: 39.17 KG/M2 | TEMPERATURE: 98.2 F

## 2023-09-20 DIAGNOSIS — Z13.1 DIABETES MELLITUS SCREENING: ICD-10-CM

## 2023-09-20 DIAGNOSIS — E78.2 MIXED HYPERLIPIDEMIA: ICD-10-CM

## 2023-09-20 DIAGNOSIS — Z79.01 CURRENT USE OF LONG TERM ANTICOAGULATION: Primary | ICD-10-CM

## 2023-09-20 DIAGNOSIS — R00.0 TACHYCARDIA: ICD-10-CM

## 2023-09-20 DIAGNOSIS — Z95.2 HISTORY OF MECHANICAL AORTIC VALVE REPLACEMENT: ICD-10-CM

## 2023-09-20 DIAGNOSIS — R53.81 MALAISE: ICD-10-CM

## 2023-09-20 LAB
POC INR: 2.2
PROTHROMBIN TIME, POC: 26.3

## 2023-09-20 RX ORDER — ATORVASTATIN CALCIUM 10 MG/1
10 TABLET, FILM COATED ORAL DAILY
COMMUNITY
Start: 2023-07-13

## 2023-09-20 ASSESSMENT — ENCOUNTER SYMPTOMS
VOMITING: 0
SHORTNESS OF BREATH: 0
SORE THROAT: 1
ABDOMINAL PAIN: 0
RHINORRHEA: 0
COUGH: 0
NAUSEA: 0

## 2023-09-20 NOTE — PROGRESS NOTES
Assessment/Plan:     Diagnoses and all orders for this visit:    Current use of long term anticoagulation  -     AMB POC PT/INR  -Patient has a history of an aortic mechanical valve on Coumadin for anticoagulation  -INR in office today at goal at 2.2. INR goal 2-3  -Continue Coumadin dosage 2.5 mg on all days except Tues and Thur  -Repeat INR in 1 month    History of mechanical aortic valve replacement  -Has an On-X mechanical aortic valve   -Continue anticoagulation. See plan above   -There has been some question as to his INR goal range. Patient was advised to have a goal of 2-3 although based on chart review this may have only been needed for 3 months and then switch to 1.5-2.5. Given uncertainty recommend he follow-up with his cardiologist to clarify his INR goal.    Mixed hyperlipidemia  -     CBC with Auto Differential; Future  -     Comprehensive Metabolic Panel; Future  -     Lipid Panel; Future  -Chronic. Presumed stable. -Repeat lipid panel. Tachycardia  -     AMB POC EKG ROUTINE  -     TSH; Future  -Patient endorses recent tachycardia over the last several days, max   -EKG obtained in office today noted normal sinus rhythm with heart rate of 88  -Suspect the intermittent tachycardia is secondary to viral illness that is causing his overall malaise  -Also advised routine follow-up with his cardiologist   -Return precautions discussed    Malaise  -Acute. Noted for the past couple of days.  -Suspect underlying viral illness given the sore throat  -Continue supportive care measures  -Return precautions discussed    Diabetes mellitus screening  -     Hemoglobin A1C; Future         Return in about 4 weeks (around 10/18/2023), or if symptoms worsen or fail to improve, for INR check. Discussed expected course/resolution/complications of diagnosis in detail with patient. Medication risks/benefits/costs/interactions/alternatives discussed with patient.     Pt expressed understanding with the

## 2023-09-21 LAB
ALBUMIN SERPL-MCNC: 4.3 G/DL (ref 4.3–5.2)
ALBUMIN/GLOB SERPL: 1.3 {RATIO} (ref 1.2–2.2)
ALP SERPL-CCNC: 114 IU/L (ref 44–121)
ALT SERPL-CCNC: 17 IU/L (ref 0–44)
AST SERPL-CCNC: 19 IU/L (ref 0–40)
BASOPHILS # BLD AUTO: 0 X10E3/UL (ref 0–0.2)
BASOPHILS NFR BLD AUTO: 1 %
BILIRUB SERPL-MCNC: 0.9 MG/DL (ref 0–1.2)
BUN SERPL-MCNC: 20 MG/DL (ref 6–20)
BUN/CREAT SERPL: 16 (ref 9–20)
CALCIUM SERPL-MCNC: 9.5 MG/DL (ref 8.7–10.2)
CHLORIDE SERPL-SCNC: 101 MMOL/L (ref 96–106)
CHOLEST SERPL-MCNC: 158 MG/DL (ref 100–199)
CO2 SERPL-SCNC: 21 MMOL/L (ref 20–29)
CREAT SERPL-MCNC: 1.24 MG/DL (ref 0.76–1.27)
EGFRCR SERPLBLD CKD-EPI 2021: 82 ML/MIN/1.73
EOSINOPHIL # BLD AUTO: 0.1 X10E3/UL (ref 0–0.4)
EOSINOPHIL NFR BLD AUTO: 1 %
ERYTHROCYTE [DISTWIDTH] IN BLOOD BY AUTOMATED COUNT: 16.4 % (ref 11.6–15.4)
GLOBULIN SER CALC-MCNC: 3.2 G/DL (ref 1.5–4.5)
GLUCOSE SERPL-MCNC: 103 MG/DL (ref 70–99)
HBA1C MFR BLD: 5.5 % (ref 4.8–5.6)
HCT VFR BLD AUTO: 39.6 % (ref 37.5–51)
HDLC SERPL-MCNC: 30 MG/DL
HGB BLD-MCNC: 12.5 G/DL (ref 13–17.7)
IMM GRANULOCYTES # BLD AUTO: 0 X10E3/UL (ref 0–0.1)
IMM GRANULOCYTES NFR BLD AUTO: 1 %
LDLC SERPL CALC-MCNC: 94 MG/DL (ref 0–99)
LYMPHOCYTES # BLD AUTO: 0.8 X10E3/UL (ref 0.7–3.1)
LYMPHOCYTES NFR BLD AUTO: 13 %
MCH RBC QN AUTO: 26.7 PG (ref 26.6–33)
MCHC RBC AUTO-ENTMCNC: 31.6 G/DL (ref 31.5–35.7)
MCV RBC AUTO: 84 FL (ref 79–97)
MONOCYTES # BLD AUTO: 0.4 X10E3/UL (ref 0.1–0.9)
MONOCYTES NFR BLD AUTO: 7 %
NEUTROPHILS # BLD AUTO: 4.6 X10E3/UL (ref 1.4–7)
NEUTROPHILS NFR BLD AUTO: 77 %
PLATELET # BLD AUTO: 170 X10E3/UL (ref 150–450)
POTASSIUM SERPL-SCNC: 4.4 MMOL/L (ref 3.5–5.2)
PROT SERPL-MCNC: 7.5 G/DL (ref 6–8.5)
RBC # BLD AUTO: 4.69 X10E6/UL (ref 4.14–5.8)
SODIUM SERPL-SCNC: 138 MMOL/L (ref 134–144)
TRIGL SERPL-MCNC: 198 MG/DL (ref 0–149)
TSH SERPL DL<=0.005 MIU/L-ACNC: 2.28 UIU/ML (ref 0.45–4.5)
VLDLC SERPL CALC-MCNC: 34 MG/DL (ref 5–40)
WBC # BLD AUTO: 5.9 X10E3/UL (ref 3.4–10.8)

## 2023-10-12 NOTE — TELEPHONE ENCOUNTER
PCP: Joann Quach MD    Last appt: [unfilled]  Future Appointments   Date Time Provider [address]   10/18/2023  9:00 AM NURSE IVONNE CORONADO BS AMB       Requested Prescriptions     Pending Prescriptions Disp Refills    atorvastatin (LIPITOR) 10 MG tablet [Pharmacy Med Name: ATORVASTATIN 10MG TABLETS] 90 tablet      Sig: TAKE 1 TABLET BY MOUTH DAILY       Prior labs and Blood pressures:  BP Readings from Last 3 Encounters:   09/20/23 128/77   09/05/23 127/73   08/22/23 119/77     Lab Results   Component Value Date/Time     09/20/2023 12:00 AM    K 4.4 09/20/2023 12:00 AM     09/20/2023 12:00 AM    CO2 21 09/20/2023 12:00 AM    BUN 20 09/20/2023 12:00 AM     No results found for: \"HBA1C\", \"[id number]\"  Lab Results   Component Value Date/Time    CHOL 158 09/20/2023 12:00 AM    CHOL 224 04/03/2023 12:00 AM    HDL 30 09/20/2023 12:00 AM    VLDL 75 04/03/2023 12:00 AM     No results found for: \"VITD3\", \"VD3RIA\"    Lab Results   Component Value Date/Time    TSH 2.280 09/20/2023 12:00 AM

## 2023-10-13 RX ORDER — ATORVASTATIN CALCIUM 10 MG/1
10 TABLET, FILM COATED ORAL DAILY
Qty: 90 TABLET | Refills: 1 | Status: SHIPPED | OUTPATIENT
Start: 2023-10-13

## 2023-10-14 DIAGNOSIS — K21.9 GASTROESOPHAGEAL REFLUX DISEASE, UNSPECIFIED WHETHER ESOPHAGITIS PRESENT: ICD-10-CM

## 2023-10-16 RX ORDER — PANTOPRAZOLE SODIUM 40 MG/1
40 TABLET, DELAYED RELEASE ORAL DAILY
Qty: 90 TABLET | Refills: 0 | Status: SHIPPED | OUTPATIENT
Start: 2023-10-16

## 2023-10-16 RX ORDER — FERROUS SULFATE 325(65) MG
1 TABLET ORAL 2 TIMES DAILY
Qty: 60 TABLET | Refills: 5 | Status: SHIPPED | OUTPATIENT
Start: 2023-10-16

## 2023-10-16 NOTE — TELEPHONE ENCOUNTER
PCP: Rosa Velazquez MD    Last appt: [unfilled]  Future Appointments   Date Time Provider [address]   10/18/2023  9:00 AM NURSE IVONNE CORONADO BS AMB       Requested Prescriptions     Pending Prescriptions Disp Refills    FEROSUL 325 (65 Fe) MG tablet [Pharmacy Med Name: FERROUS SULFATE 325MG (5GR) TABS] 60 tablet      Sig: TAKE 1 TABLET BY MOUTH TWICE DAILY    pantoprazole (PROTONIX) 40 MG tablet [Pharmacy Med Name: PANTOPRAZOLE 40MG TABLETS] 90 tablet 0     Sig: TAKE 1 TABLET BY MOUTH DAILY       Prior labs and Blood pressures:  BP Readings from Last 3 Encounters:   09/20/23 128/77   09/05/23 127/73   08/22/23 119/77     Lab Results   Component Value Date/Time     09/20/2023 12:00 AM    K 4.4 09/20/2023 12:00 AM     09/20/2023 12:00 AM    CO2 21 09/20/2023 12:00 AM    BUN 20 09/20/2023 12:00 AM     No results found for: \"HBA1C\", \"[id number]\"  Lab Results   Component Value Date/Time    CHOL 158 09/20/2023 12:00 AM    CHOL 224 04/03/2023 12:00 AM    HDL 30 09/20/2023 12:00 AM    VLDL 75 04/03/2023 12:00 AM     No results found for: \"VITD3\", \"VD3RIA\"    Lab Results   Component Value Date/Time    TSH 2.280 09/20/2023 12:00 AM

## 2023-10-18 ENCOUNTER — NURSE ONLY (OUTPATIENT)
Facility: CLINIC | Age: 28
End: 2023-10-18
Payer: MEDICAID

## 2023-10-18 ENCOUNTER — OFFICE VISIT (OUTPATIENT)
Facility: CLINIC | Age: 28
End: 2023-10-18
Payer: MEDICAID

## 2023-10-18 VITALS
WEIGHT: 315 LBS | TEMPERATURE: 97.1 F | SYSTOLIC BLOOD PRESSURE: 109 MMHG | HEIGHT: 75 IN | BODY MASS INDEX: 39.17 KG/M2 | DIASTOLIC BLOOD PRESSURE: 71 MMHG | OXYGEN SATURATION: 94 % | RESPIRATION RATE: 16 BRPM | HEART RATE: 75 BPM

## 2023-10-18 VITALS
DIASTOLIC BLOOD PRESSURE: 71 MMHG | HEART RATE: 71 BPM | RESPIRATION RATE: 16 BRPM | BODY MASS INDEX: 39.17 KG/M2 | WEIGHT: 315 LBS | TEMPERATURE: 97.1 F | SYSTOLIC BLOOD PRESSURE: 109 MMHG | OXYGEN SATURATION: 94 % | HEIGHT: 75 IN

## 2023-10-18 DIAGNOSIS — Z79.01 CURRENT USE OF LONG TERM ANTICOAGULATION: Primary | ICD-10-CM

## 2023-10-18 DIAGNOSIS — G47.09 OTHER INSOMNIA: ICD-10-CM

## 2023-10-18 DIAGNOSIS — R56.9 SEIZURES (HCC): ICD-10-CM

## 2023-10-18 DIAGNOSIS — Z95.2 HISTORY OF MECHANICAL AORTIC VALVE REPLACEMENT: ICD-10-CM

## 2023-10-18 LAB
POC INR: 3.5
PROTHROMBIN TIME, POC: 42

## 2023-10-18 PROCEDURE — 85610 PROTHROMBIN TIME: CPT | Performed by: FAMILY MEDICINE

## 2023-10-18 PROCEDURE — 99214 OFFICE O/P EST MOD 30 MIN: CPT | Performed by: STUDENT IN AN ORGANIZED HEALTH CARE EDUCATION/TRAINING PROGRAM

## 2023-10-18 RX ORDER — ATORVASTATIN CALCIUM 10 MG/1
10 TABLET, FILM COATED ORAL DAILY
COMMUNITY
Start: 2023-04-17

## 2023-10-18 RX ORDER — LEVETIRACETAM 750 MG/1
750 TABLET ORAL 2 TIMES DAILY
COMMUNITY
Start: 2023-10-04

## 2023-10-18 RX ORDER — WARFARIN SODIUM 2.5 MG/1
TABLET ORAL
Qty: 90 TABLET | Refills: 1 | Status: SHIPPED | OUTPATIENT
Start: 2023-10-18

## 2023-10-18 RX ORDER — TRAZODONE HYDROCHLORIDE 50 MG/1
50 TABLET ORAL NIGHTLY
Qty: 90 TABLET | Refills: 1 | Status: SHIPPED | OUTPATIENT
Start: 2023-10-18

## 2023-10-18 ASSESSMENT — ENCOUNTER SYMPTOMS
RHINORRHEA: 0
NAUSEA: 0
ABDOMINAL PAIN: 0
SHORTNESS OF BREATH: 0
COUGH: 0
VOMITING: 0

## 2023-10-18 NOTE — PROGRESS NOTES
Abdominal:      General: Bowel sounds are normal. There is no distension. Palpations: Abdomen is soft. Tenderness: There is no abdominal tenderness. There is no guarding or rebound. Musculoskeletal:      Cervical back: Neck supple. Right lower leg: No edema. Left lower leg: No edema. Neurological:      Mental Status: He is alert and oriented to person, place, and time. Gait: Gait normal.         No results found for this visit on 10/18/23.

## 2023-11-06 ENCOUNTER — TELEPHONE (OUTPATIENT)
Facility: CLINIC | Age: 28
End: 2023-11-06

## 2023-11-16 ENCOUNTER — OFFICE VISIT (OUTPATIENT)
Facility: CLINIC | Age: 28
End: 2023-11-16

## 2023-11-16 VITALS
BODY MASS INDEX: 38.05 KG/M2 | HEART RATE: 78 BPM | DIASTOLIC BLOOD PRESSURE: 75 MMHG | TEMPERATURE: 97.7 F | HEIGHT: 75 IN | WEIGHT: 306 LBS | SYSTOLIC BLOOD PRESSURE: 126 MMHG | OXYGEN SATURATION: 97 % | RESPIRATION RATE: 16 BRPM

## 2023-11-16 DIAGNOSIS — Z79.01 CURRENT USE OF LONG TERM ANTICOAGULATION: Primary | ICD-10-CM

## 2023-11-16 DIAGNOSIS — Z95.2 HISTORY OF MECHANICAL AORTIC VALVE REPLACEMENT: ICD-10-CM

## 2023-11-16 DIAGNOSIS — R56.9 SEIZURES (HCC): ICD-10-CM

## 2023-11-16 LAB
POC INR: 3.9
PROTHROMBIN TIME, POC: 46.5

## 2023-11-16 RX ORDER — WARFARIN SODIUM 1 MG/1
TABLET ORAL
Qty: 90 TABLET | Refills: 1 | Status: SHIPPED | OUTPATIENT
Start: 2023-11-16

## 2023-11-16 ASSESSMENT — PATIENT HEALTH QUESTIONNAIRE - PHQ9
8. MOVING OR SPEAKING SO SLOWLY THAT OTHER PEOPLE COULD HAVE NOTICED. OR THE OPPOSITE, BEING SO FIGETY OR RESTLESS THAT YOU HAVE BEEN MOVING AROUND A LOT MORE THAN USUAL: 0
10. IF YOU CHECKED OFF ANY PROBLEMS, HOW DIFFICULT HAVE THESE PROBLEMS MADE IT FOR YOU TO DO YOUR WORK, TAKE CARE OF THINGS AT HOME, OR GET ALONG WITH OTHER PEOPLE: 0
4. FEELING TIRED OR HAVING LITTLE ENERGY: 0
5. POOR APPETITE OR OVEREATING: 0
9. THOUGHTS THAT YOU WOULD BE BETTER OFF DEAD, OR OF HURTING YOURSELF: 0
7. TROUBLE CONCENTRATING ON THINGS, SUCH AS READING THE NEWSPAPER OR WATCHING TELEVISION: 0
SUM OF ALL RESPONSES TO PHQ QUESTIONS 1-9: 0
6. FEELING BAD ABOUT YOURSELF - OR THAT YOU ARE A FAILURE OR HAVE LET YOURSELF OR YOUR FAMILY DOWN: 0
SUM OF ALL RESPONSES TO PHQ QUESTIONS 1-9: 0
3. TROUBLE FALLING OR STAYING ASLEEP: 0
SUM OF ALL RESPONSES TO PHQ9 QUESTIONS 1 & 2: 0
SUM OF ALL RESPONSES TO PHQ QUESTIONS 1-9: 0
1. LITTLE INTEREST OR PLEASURE IN DOING THINGS: 0
SUM OF ALL RESPONSES TO PHQ QUESTIONS 1-9: 0
2. FEELING DOWN, DEPRESSED OR HOPELESS: 0

## 2023-11-16 ASSESSMENT — ENCOUNTER SYMPTOMS
SHORTNESS OF BREATH: 0
VOMITING: 0
ABDOMINAL PAIN: 0
NAUSEA: 0
RHINORRHEA: 0
COUGH: 0

## 2023-11-16 ASSESSMENT — COLUMBIA-SUICIDE SEVERITY RATING SCALE - C-SSRS
7. DID THIS OCCUR IN THE LAST THREE MONTHS: NO
3. HAVE YOU BEEN THINKING ABOUT HOW YOU MIGHT KILL YOURSELF?: NO
5. HAVE YOU STARTED TO WORK OUT OR WORKED OUT THE DETAILS OF HOW TO KILL YOURSELF? DO YOU INTEND TO CARRY OUT THIS PLAN?: NO
4. HAVE YOU HAD THESE THOUGHTS AND HAD SOME INTENTION OF ACTING ON THEM?: NO

## 2023-11-16 NOTE — PROGRESS NOTES
Assessment/Plan:     Diagnoses and all orders for this visit:    Current use of long term anticoagulation  -     AMB POC PT/INR  -     warfarin (COUMADIN) 1 MG tablet; Take as directed by provider based on INR  -Patient has a history of an aortic mechanical valve on Coumadin for anticoagulation  -INR in office today is elevated at 3.9. INR goal 2-3  -Previous dosage was Coumadin 2.5 mg on all days except Tues, Thur and Sat when he skips dosages  -Given persistent elevation in INR will now change dose to 2 mg on Mondays and Thursdays and 1 mg all other days  -Repeat INR in 1-2 weeks    History of mechanical aortic valve replacement  -     warfarin (COUMADIN) 1 MG tablet; Take as directed by provider based on INR  -Has an On-X mechanical aortic valve   -Continue anticoagulation. See plan above   -There has been some question as to his INR goal range. Patient was advised to have a goal of 2-3 although based on chart review this may have only been needed for 3 months and then switch to 1.5-2.5. Given uncertainty recommend he follow-up with his cardiologist to clarify his INR goal.    Seizures ([address])  -     CBC with Auto Differential; Future  -     Comprehensive Metabolic Panel; Future  -Recently hospitalized for new onset seizure  -Evaluated by neurology inpatient and started on Keppra 750 mg twice daily  -Advised to schedule follow-up appointment in January. Has an appointment on the 24th  -No further seizure-like activity  -Does endorse some decreased appetite on Keppra  -Continue current regimen until evaluated by neurology  -Check repeat lab work today       Return in about 2 weeks (around 11/30/2023), or if symptoms worsen or fail to improve. Discussed expected course/resolution/complications of diagnosis in detail with patient. Medication risks/benefits/costs/interactions/alternatives discussed with patient.     Pt expressed understanding with the diagnosis and plan      Subjective:      Sandra Logan is a

## 2023-11-28 ASSESSMENT — ENCOUNTER SYMPTOMS
VOMITING: 0
COUGH: 0
SHORTNESS OF BREATH: 0
RHINORRHEA: 0
NAUSEA: 0
ABDOMINAL PAIN: 0

## 2023-11-29 ENCOUNTER — OFFICE VISIT (OUTPATIENT)
Facility: CLINIC | Age: 28
End: 2023-11-29
Payer: MEDICAID

## 2023-11-29 VITALS
OXYGEN SATURATION: 97 % | BODY MASS INDEX: 34.48 KG/M2 | RESPIRATION RATE: 16 BRPM | WEIGHT: 298 LBS | TEMPERATURE: 98.2 F | DIASTOLIC BLOOD PRESSURE: 74 MMHG | SYSTOLIC BLOOD PRESSURE: 114 MMHG | HEART RATE: 92 BPM | HEIGHT: 78 IN

## 2023-11-29 DIAGNOSIS — Z91.148 NONCOMPLIANCE WITH MEDICATION TREATMENT DUE TO DIFFICULTY WITH DOSING: ICD-10-CM

## 2023-11-29 DIAGNOSIS — Z95.2 HISTORY OF MECHANICAL AORTIC VALVE REPLACEMENT: ICD-10-CM

## 2023-11-29 DIAGNOSIS — Z79.01 CURRENT USE OF LONG TERM ANTICOAGULATION: Primary | ICD-10-CM

## 2023-11-29 DIAGNOSIS — R56.9 SEIZURES (HCC): ICD-10-CM

## 2023-11-29 LAB
INR, POC: 8 %
VALID INTERNAL CONTROL, POC: YES

## 2023-11-29 PROCEDURE — 85610 PROTHROMBIN TIME: CPT | Performed by: FAMILY MEDICINE

## 2023-11-29 PROCEDURE — 99215 OFFICE O/P EST HI 40 MIN: CPT | Performed by: FAMILY MEDICINE

## 2023-11-29 RX ORDER — WARFARIN SODIUM 1 MG/1
TABLET ORAL
Qty: 30 TABLET | Refills: 1 | Status: SHIPPED | OUTPATIENT
Start: 2023-11-29

## 2023-11-29 RX ORDER — WARFARIN SODIUM 2 MG/1
TABLET ORAL
Qty: 30 TABLET | Refills: 1 | Status: SHIPPED | OUTPATIENT
Start: 2023-11-29

## 2023-11-29 RX ORDER — WARFARIN SODIUM 1 MG/1
TABLET ORAL
Qty: 30 TABLET | Refills: 1 | Status: SHIPPED | OUTPATIENT
Start: 2023-11-29 | End: 2023-11-29

## 2023-11-29 ASSESSMENT — COLUMBIA-SUICIDE SEVERITY RATING SCALE - C-SSRS
4. HAVE YOU HAD THESE THOUGHTS AND HAD SOME INTENTION OF ACTING ON THEM?: NO
3. HAVE YOU BEEN THINKING ABOUT HOW YOU MIGHT KILL YOURSELF?: NO
5. HAVE YOU STARTED TO WORK OUT OR WORKED OUT THE DETAILS OF HOW TO KILL YOURSELF? DO YOU INTEND TO CARRY OUT THIS PLAN?: NO
7. DID THIS OCCUR IN THE LAST THREE MONTHS: NO

## 2023-11-29 ASSESSMENT — PATIENT HEALTH QUESTIONNAIRE - PHQ9
SUM OF ALL RESPONSES TO PHQ QUESTIONS 1-9: 0
SUM OF ALL RESPONSES TO PHQ QUESTIONS 1-9: 0
7. TROUBLE CONCENTRATING ON THINGS, SUCH AS READING THE NEWSPAPER OR WATCHING TELEVISION: 0
5. POOR APPETITE OR OVEREATING: 0
SUM OF ALL RESPONSES TO PHQ9 QUESTIONS 1 & 2: 0
6. FEELING BAD ABOUT YOURSELF - OR THAT YOU ARE A FAILURE OR HAVE LET YOURSELF OR YOUR FAMILY DOWN: 0
SUM OF ALL RESPONSES TO PHQ QUESTIONS 1-9: 0
SUM OF ALL RESPONSES TO PHQ QUESTIONS 1-9: 0
4. FEELING TIRED OR HAVING LITTLE ENERGY: 0
3. TROUBLE FALLING OR STAYING ASLEEP: 0
10. IF YOU CHECKED OFF ANY PROBLEMS, HOW DIFFICULT HAVE THESE PROBLEMS MADE IT FOR YOU TO DO YOUR WORK, TAKE CARE OF THINGS AT HOME, OR GET ALONG WITH OTHER PEOPLE: 0
8. MOVING OR SPEAKING SO SLOWLY THAT OTHER PEOPLE COULD HAVE NOTICED. OR THE OPPOSITE, BEING SO FIGETY OR RESTLESS THAT YOU HAVE BEEN MOVING AROUND A LOT MORE THAN USUAL: 0
1. LITTLE INTEREST OR PLEASURE IN DOING THINGS: 0
2. FEELING DOWN, DEPRESSED OR HOPELESS: 0
9. THOUGHTS THAT YOU WOULD BE BETTER OFF DEAD, OR OF HURTING YOURSELF: 0

## 2023-12-05 ENCOUNTER — OFFICE VISIT (OUTPATIENT)
Facility: CLINIC | Age: 28
End: 2023-12-05
Payer: MEDICAID

## 2023-12-05 VITALS
TEMPERATURE: 97.1 F | HEIGHT: 75 IN | OXYGEN SATURATION: 95 % | SYSTOLIC BLOOD PRESSURE: 114 MMHG | BODY MASS INDEX: 34.07 KG/M2 | RESPIRATION RATE: 16 BRPM | HEART RATE: 84 BPM | WEIGHT: 274 LBS | DIASTOLIC BLOOD PRESSURE: 77 MMHG

## 2023-12-05 DIAGNOSIS — Z79.01 CURRENT USE OF LONG TERM ANTICOAGULATION: Primary | ICD-10-CM

## 2023-12-05 DIAGNOSIS — Z95.2 HISTORY OF MECHANICAL AORTIC VALVE REPLACEMENT: ICD-10-CM

## 2023-12-05 LAB
POC INR: 7.5
PROTHROMBIN TIME, POC: 89.5

## 2023-12-05 PROCEDURE — 85610 PROTHROMBIN TIME: CPT | Performed by: STUDENT IN AN ORGANIZED HEALTH CARE EDUCATION/TRAINING PROGRAM

## 2023-12-05 PROCEDURE — 99214 OFFICE O/P EST MOD 30 MIN: CPT | Performed by: STUDENT IN AN ORGANIZED HEALTH CARE EDUCATION/TRAINING PROGRAM

## 2023-12-05 ASSESSMENT — ENCOUNTER SYMPTOMS
ABDOMINAL PAIN: 0
COUGH: 0
NAUSEA: 0
RHINORRHEA: 0
VOMITING: 0
SHORTNESS OF BREATH: 0

## 2023-12-13 RX ORDER — CARVEDILOL 12.5 MG/1
TABLET ORAL
Qty: 180 TABLET | Refills: 1 | Status: SHIPPED | OUTPATIENT
Start: 2023-12-13

## 2023-12-13 NOTE — TELEPHONE ENCOUNTER
PCP: Juan R Vaughn MD    Last appt: [unfilled]  Patient was last seen on 12/05/2023  No future appointments.     Requested Prescriptions     Pending Prescriptions Disp Refills    carvedilol (COREG) 12.5 MG tablet [Pharmacy Med Name: CARVEDILOL 12.5MG TABLETS] 180 tablet 1     Sig: TAKE 1 TABLET BY MOUTH TWICE DAILY       Prior labs and Blood pressures:  BP Readings from Last 3 Encounters:   12/05/23 114/77   11/29/23 114/74   11/16/23 126/75     Lab Results   Component Value Date/Time     09/20/2023 12:00 AM    K 4.4 09/20/2023 12:00 AM     09/20/2023 12:00 AM    CO2 21 09/20/2023 12:00 AM    BUN 20 09/20/2023 12:00 AM     No results found for: \"HBA1C\", \"[id number]\"  Lab Results   Component Value Date/Time    CHOL 158 09/20/2023 12:00 AM    CHOL 224 04/03/2023 12:00 AM    HDL 30 09/20/2023 12:00 AM    VLDL 75 04/03/2023 12:00 AM     No results found for: \"VITD3\", \"VD3RIA\"    Lab Results   Component Value Date/Time    TSH 2.280 09/20/2023 12:00 AM

## 2023-12-15 ENCOUNTER — OFFICE VISIT (OUTPATIENT)
Facility: CLINIC | Age: 28
End: 2023-12-15

## 2023-12-15 VITALS
SYSTOLIC BLOOD PRESSURE: 118 MMHG | BODY MASS INDEX: 36.06 KG/M2 | DIASTOLIC BLOOD PRESSURE: 79 MMHG | RESPIRATION RATE: 16 BRPM | TEMPERATURE: 96.9 F | HEIGHT: 75 IN | HEART RATE: 68 BPM | WEIGHT: 290 LBS | OXYGEN SATURATION: 96 %

## 2023-12-15 DIAGNOSIS — Z79.01 CURRENT USE OF LONG TERM ANTICOAGULATION: Primary | ICD-10-CM

## 2023-12-15 DIAGNOSIS — Z95.2 HISTORY OF MECHANICAL AORTIC VALVE REPLACEMENT: ICD-10-CM

## 2023-12-15 LAB
POC INR: 1.8
PROTHROMBIN TIME, POC: 21.1

## 2023-12-15 NOTE — PROGRESS NOTES
Identified pt with two pt identifiers(name and ). Reviewed record in preparation for visit and have obtained necessary documentation. Chief Complaint   Patient presents with    Office Visit for Anticoagulation Management     INR        Health Maintenance Due   Topic    Hepatitis B vaccine (1 of 3 - 3-dose series)    Varicella vaccine (1 of 2 - 2-dose childhood series)    HPV vaccine (1 - Male 2-dose series)    HIV screen     Hepatitis C screen     DTaP/Tdap/Td vaccine (5 - Td or Tdap)    Flu vaccine (1)    COVID-19 Vaccine (2 -  season)       Coordination of Care Questionnaire:  :   1) Have you been to an emergency room, urgent care, or hospitalized since your last visit? If yes, where when, and reason for visit? No      2. Have seen or consulted any other health care provider since your last visit? If yes, where when, and reason for visit?  no        Patient is accompanied by self I have received verbal consent from Margarito Barry to discuss any/all medical information while they are present in the room.
Determinants of Health     Financial Resource Strain: Low Risk  (5/9/2023)    Overall Financial Resource Strain (CARDIA)     Difficulty of Paying Living Expenses: Not hard at all   Food Insecurity: Not on file (5/9/2023)   Transportation Needs: Unknown (5/9/2023)    PRAPARE - Transportation     Lack of Transportation (Medical): Not on file     Lack of Transportation (Non-Medical): No   Physical Activity: Insufficiently Active (10/10/2022)    Exercise Vital Sign     Days of Exercise per Week: 1 day     Minutes of Exercise per Session: 10 min   Stress: Not on file   Social Connections: Not on file   Intimate Partner Violence: Not At Risk (10/10/2022)    Humiliation, Afraid, Rape, and Kick questionnaire     Fear of Current or Ex-Partner: No     Emotionally Abused: No     Physically Abused: No     Sexually Abused: No   Housing Stability: Unknown (5/9/2023)    Housing Stability Vital Sign     Unable to Pay for Housing in the Last Year: Not on file     Number of [address] in the Last Year: Not on file     Unstable Housing in the Last Year: No        Patient is a 61-year-old male presents to the office today for INR check. Patient has a mechanical heart valve with what I believe an INR goal of 2-3. Although given his heart valve specifics I do recommend evaluation by cardiology to confirm his INR goal.  Currently his INR is slightly below goal at 1.8. Previous INR was elevated at 7.5. He skipped 2 dosages last week and is now on 1 mg daily of his coumadin. Patient denies any abnormal bleeding or recent change in medications. He denies any acute concerns. Of note patient states he has been working on losing weight. ROS:   Review of Systems   Constitutional:  Negative for chills and fever. HENT:  Negative for rhinorrhea. Respiratory:  Negative for cough and shortness of breath. Cardiovascular:  Negative for chest pain and leg swelling.    Gastrointestinal:  Negative for abdominal pain, nausea and

## 2024-01-11 NOTE — PATIENT INSTRUCTIONS
clots will not be prevented, but if the INR is too high, there is an increased risk of bleeding. This is why those who take warfarin must have their blood tested so frequently. Unlike most medications that are administered as a fixed dose, warfarin dosing is adjusted according to the INR blood test results; the dose usually changes over time. Difference Between Brand-Name and Generic Medications  Generic drugs are supposed to have the same dosage, therapeutic effects, route of administration, side effects and strength as the original drug. The [place] Food and Drug Administration requires that all generic drugs be as safe and effective as brand-name drugs. Generic drugs are often less expensive than their brand-name counterparts, because the generic manufacturers have not incurred the expenses of developing and marketing a new drug. In the [facility] do not allow generic drugs to look exactly like the brand-name drug; however, the generic drug must have the same active ingredients. In the case of Coumadin (the brand-name product) and warfarin (the generic product), the manufacturers attempted to keep the colors consistent with the strength of the pills. The goal is to allow the patient to identify the color-coded dose and prevent mix-ups or errors. Therefore, if the color or dose of the dispensed tablet appears different from the pill taken previously, the patient should immediately notify the pharmacist or health care professional.     If you plan to travel abroad while on these medications, ensure you have an adequate supply for your stay. If you need to purchase these medications outside the [place] first ask your health care professional to confirm the accuracy and quality of the medication. Warfarin must be taken exactly as prescribed.   Never increase or decrease your dose unless told to do so by your health care professional. If you miss a dose, call your health care office for 1 2 1 1

## 2024-01-12 ENCOUNTER — OFFICE VISIT (OUTPATIENT)
Facility: CLINIC | Age: 29
End: 2024-01-12
Payer: MEDICAID

## 2024-01-12 VITALS
SYSTOLIC BLOOD PRESSURE: 137 MMHG | WEIGHT: 288 LBS | HEIGHT: 75 IN | RESPIRATION RATE: 16 BRPM | HEART RATE: 79 BPM | TEMPERATURE: 97.7 F | OXYGEN SATURATION: 97 % | DIASTOLIC BLOOD PRESSURE: 84 MMHG | BODY MASS INDEX: 35.81 KG/M2

## 2024-01-12 DIAGNOSIS — Z79.01 CURRENT USE OF LONG TERM ANTICOAGULATION: Primary | ICD-10-CM

## 2024-01-12 DIAGNOSIS — R56.9 SEIZURES (HCC): ICD-10-CM

## 2024-01-12 DIAGNOSIS — K21.9 GASTROESOPHAGEAL REFLUX DISEASE, UNSPECIFIED WHETHER ESOPHAGITIS PRESENT: ICD-10-CM

## 2024-01-12 DIAGNOSIS — Z95.2 HISTORY OF MECHANICAL AORTIC VALVE REPLACEMENT: ICD-10-CM

## 2024-01-12 LAB
POC INR: 1.1
PROTHROMBIN TIME, POC: 12.8

## 2024-01-12 PROCEDURE — 85610 PROTHROMBIN TIME: CPT | Performed by: STUDENT IN AN ORGANIZED HEALTH CARE EDUCATION/TRAINING PROGRAM

## 2024-01-12 PROCEDURE — 99214 OFFICE O/P EST MOD 30 MIN: CPT | Performed by: STUDENT IN AN ORGANIZED HEALTH CARE EDUCATION/TRAINING PROGRAM

## 2024-01-12 RX ORDER — PANTOPRAZOLE SODIUM 40 MG/1
40 TABLET, DELAYED RELEASE ORAL DAILY
Qty: 90 TABLET | Refills: 0 | Status: SHIPPED | OUTPATIENT
Start: 2024-01-12

## 2024-01-12 ASSESSMENT — PATIENT HEALTH QUESTIONNAIRE - PHQ9
10. IF YOU CHECKED OFF ANY PROBLEMS, HOW DIFFICULT HAVE THESE PROBLEMS MADE IT FOR YOU TO DO YOUR WORK, TAKE CARE OF THINGS AT HOME, OR GET ALONG WITH OTHER PEOPLE: 0
SUM OF ALL RESPONSES TO PHQ9 QUESTIONS 1 & 2: 0
4. FEELING TIRED OR HAVING LITTLE ENERGY: 0
SUM OF ALL RESPONSES TO PHQ QUESTIONS 1-9: 0
7. TROUBLE CONCENTRATING ON THINGS, SUCH AS READING THE NEWSPAPER OR WATCHING TELEVISION: 0
5. POOR APPETITE OR OVEREATING: 0
SUM OF ALL RESPONSES TO PHQ QUESTIONS 1-9: 0
2. FEELING DOWN, DEPRESSED OR HOPELESS: 0
8. MOVING OR SPEAKING SO SLOWLY THAT OTHER PEOPLE COULD HAVE NOTICED. OR THE OPPOSITE, BEING SO FIGETY OR RESTLESS THAT YOU HAVE BEEN MOVING AROUND A LOT MORE THAN USUAL: 0
3. TROUBLE FALLING OR STAYING ASLEEP: 0
9. THOUGHTS THAT YOU WOULD BE BETTER OFF DEAD, OR OF HURTING YOURSELF: 0
6. FEELING BAD ABOUT YOURSELF - OR THAT YOU ARE A FAILURE OR HAVE LET YOURSELF OR YOUR FAMILY DOWN: 0
1. LITTLE INTEREST OR PLEASURE IN DOING THINGS: 0
SUM OF ALL RESPONSES TO PHQ QUESTIONS 1-9: 0
SUM OF ALL RESPONSES TO PHQ QUESTIONS 1-9: 0

## 2024-01-12 ASSESSMENT — ENCOUNTER SYMPTOMS
COUGH: 0
VOMITING: 0
RHINORRHEA: 0
SHORTNESS OF BREATH: 0
NAUSEA: 0
ABDOMINAL PAIN: 0

## 2024-01-12 NOTE — PROGRESS NOTES
Assessment/Plan:     Diagnoses and all orders for this visit:    Current use of long term anticoagulation  -     AMB POC PT/INR  -Patient has a history of an aortic mechanical valve on Coumadin for anticoagulation  -INR in office today is 1.1. INR goal 2-3, although not certain based on his type of valve, advised to schedule f/u with cardiology    -No abnormal bleeding noted per patient   -Given INR is low discussed with patient how important it is to establish with cardiology for them to take over his Coumadin management  -Will have patient take 2 mg on Fridays and 1 mg of Coumadin all other days  -Discussed the risks of not having close follow-up for his INR's given how labile they are and the risks of not being within range  -Repeat INR in 4 days for reevaluation especially since he will be restarting his Keppra    History of mechanical aortic valve replacement   -Has an On-X mechanical aortic valve   -Continue anticoagulation. See plan above     Seizures ([facility])  -Has been without his Keppra for 2 weeks. No seizures reported   -Has now picked up his refill on his Keppra and is resuming medication  -Continue follow-up with neurology      Return in about 4 days (around 1/16/2024), or if symptoms worsen or fail to improve.     Discussed expected course/resolution/complications of diagnosis in detail with patient.    Medication risks/benefits/costs/interactions/alternatives discussed with patient.    Pt expressed understanding with the diagnosis and plan      Subjective:      Addison Isaac is a 28 y.o. male who presents for had concerns including Office Visit for Anticoagulation Management.     Current Outpatient Medications   Medication Sig Dispense Refill    carvedilol (COREG) 12.5 MG tablet TAKE 1 TABLET BY MOUTH TWICE DAILY 180 tablet 1    warfarin (COUMADIN) 2 MG tablet Take 2mg by mouth Monday and Thursday, other days take 1 mg. (Patient taking differently: Take as directed) 30 tablet 1    warfarin (COUMADIN) 1 MG

## 2024-01-12 NOTE — TELEPHONE ENCOUNTER
PCP: Viktoriya Chow MD    Last appt: [unfilled]  Future Appointments   Date Time Provider Department Center   1/16/2024 11:40 AM Viktoriya Chow MD [facility] BS AMB       Requested Prescriptions     Pending Prescriptions Disp Refills    pantoprazole (PROTONIX) 40 MG tablet [Pharmacy Med Name: PANTOPRAZOLE 40MG TABLETS] 90 tablet 0     Sig: TAKE 1 TABLET BY MOUTH DAILY       Prior labs and Blood pressures:  BP Readings from Last 3 Encounters:   01/12/24 137/84   12/15/23 118/79   12/05/23 114/77     Lab Results   Component Value Date/Time     09/20/2023 12:00 AM    K 4.4 09/20/2023 12:00 AM     09/20/2023 12:00 AM    CO2 21 09/20/2023 12:00 AM    BUN 20 09/20/2023 12:00 AM     No results found for: \"HBA1C\", \"[id number]\"  Lab Results   Component Value Date/Time    CHOL 158 09/20/2023 12:00 AM    CHOL 224 04/03/2023 12:00 AM    HDL 30 09/20/2023 12:00 AM    VLDL 75 04/03/2023 12:00 AM     No results found for: \"VITD3\", \"VD3RIA\"    Lab Results   Component Value Date/Time    TSH 2.280 09/20/2023 12:00 AM

## 2024-01-12 NOTE — PROGRESS NOTES
1. Have you been to the ER, urgent care clinic since your last visit?  Hospitalized since your last visit?No    2. Have you seen or consulted any other health care providers outside of the [facility] since your last visit?  Include any pap smears or colon screening. No    Chief Complaint   Patient presents with    Office Visit for Anticoagulation Management     Health Maintenance Due   Topic Date Due    Hepatitis B vaccine (1 of 3 - 3-dose series) Never done    Varicella vaccine (1 of 2 - 2-dose childhood series) Never done    HPV vaccine (1 - Male 2-dose series) Never done    HIV screen  Never done    Hepatitis C screen  Never done    DTaP/Tdap/Td vaccine (5 - Td or Tdap) 04/28/2018    Flu vaccine (1) Never done    COVID-19 Vaccine (2 - 2023-24 season) 09/01/2023     PHQ-9 Total Score: 0 (1/12/2024  9:09 AM)  Thoughts that you would be better off dead, or of hurting yourself in some way: 0 (1/12/2024  9:09 AM)        1/12/2024     9:00 AM   AMB Abuse Screening   Do you ever feel afraid of your partner? N   Are you in a relationship with someone who physically or mentally threatens you? N   Is it safe for you to go home? Y     Vitals:    01/12/24 0909   BP: 137/84   Pulse: 79   Resp: 16   Temp: 97.7 °F (36.5 °C)   SpO2: 97%

## 2024-01-16 ENCOUNTER — OFFICE VISIT (OUTPATIENT)
Facility: CLINIC | Age: 29
End: 2024-01-16
Payer: MEDICAID

## 2024-01-16 VITALS
TEMPERATURE: 99 F | HEART RATE: 69 BPM | OXYGEN SATURATION: 96 % | HEIGHT: 75 IN | BODY MASS INDEX: 35.68 KG/M2 | WEIGHT: 287 LBS | SYSTOLIC BLOOD PRESSURE: 132 MMHG | DIASTOLIC BLOOD PRESSURE: 81 MMHG

## 2024-01-16 DIAGNOSIS — Z79.01 CURRENT USE OF LONG TERM ANTICOAGULATION: Primary | ICD-10-CM

## 2024-01-16 LAB
POC INR: 1.1
PROTHROMBIN TIME, POC: 12.8

## 2024-01-16 PROCEDURE — 99213 OFFICE O/P EST LOW 20 MIN: CPT | Performed by: STUDENT IN AN ORGANIZED HEALTH CARE EDUCATION/TRAINING PROGRAM

## 2024-01-16 PROCEDURE — 85610 PROTHROMBIN TIME: CPT | Performed by: STUDENT IN AN ORGANIZED HEALTH CARE EDUCATION/TRAINING PROGRAM

## 2024-01-16 ASSESSMENT — ENCOUNTER SYMPTOMS
VOMITING: 0
COUGH: 0
NAUSEA: 0
ABDOMINAL PAIN: 0
RHINORRHEA: 0
SHORTNESS OF BREATH: 0

## 2024-01-16 NOTE — PROGRESS NOTES
Identified pt with two pt identifiers(name and ). Reviewed record in preparation for visit and have obtained necessary documentation. All patient medications has been reviewed.  Chief Complaint   Patient presents with    Follow-up    Coagulation Disorder       Vitals:    24 1144   BP: 132/81   Pulse: 69   Temp: 99 °F (37.2 °C)   SpO2: 96%                   Coordination of Care Questionnaire:   1) Have you been to an emergency room, urgent care, or hospitalized since your last visit?   no     2. Have seen or consulted any other health care provider since your last visit? no

## 2024-01-16 NOTE — PROGRESS NOTES
Assessment/Plan:     Diagnoses and all orders for this visit:    Current use of long term anticoagulation  -     AMB POC PT/INR  -Patient has a history of an aortic mechanical valve on Coumadin for anticoagulation  -INR in office today is 1.1. INR goal 2-3, although not certain based on his type of valve, advised to schedule f/u with cardiology    -No abnormal bleeding noted per patient   -Given INR is low discussed with patient how important it is to establish with cardiology for them to take over his Coumadin management  -Given no increase in INR with last med change will now have patient take 2 mg on Sun/Tue/Fri and 1 mg of Coumadin all other days  -Discussed the risks of not having close follow-up for his INR's given how labile they are and the risks of not being within range      Return in about 1 week (around 1/23/2024), or if symptoms worsen or fail to improve.     Discussed expected course/resolution/complications of diagnosis in detail with patient.    Medication risks/benefits/costs/interactions/alternatives discussed with patient.    Pt expressed understanding with the diagnosis and plan      Subjective:      Addison Isaac is a 28 y.o. male who presents for had concerns including Follow-up and Coagulation Disorder.     Current Outpatient Medications   Medication Sig Dispense Refill    pantoprazole (PROTONIX) 40 MG tablet TAKE 1 TABLET BY MOUTH DAILY 90 tablet 0    carvedilol (COREG) 12.5 MG tablet TAKE 1 TABLET BY MOUTH TWICE DAILY 180 tablet 1    warfarin (COUMADIN) 2 MG tablet Take 2mg by mouth Monday and Thursday, other days take 1 mg. (Patient taking differently: Take as directed) 30 tablet 1    warfarin (COUMADIN) 1 MG tablet Take 1 mg by mouth Sunday, Tuesday, Wednesday, Friday, and Saturday; take 2 mg on Monday and Thursday (Patient taking differently: Take as directed) 30 tablet 1    levETIRAcetam (KEPPRA) 750 MG tablet Take 1 tablet by mouth 2 times daily      traZODone (DESYREL) 50 MG tablet Take

## 2024-01-23 ENCOUNTER — OFFICE VISIT (OUTPATIENT)
Facility: CLINIC | Age: 29
End: 2024-01-23
Payer: MEDICAID

## 2024-01-23 VITALS
HEART RATE: 74 BPM | OXYGEN SATURATION: 97 % | DIASTOLIC BLOOD PRESSURE: 73 MMHG | BODY MASS INDEX: 35.81 KG/M2 | RESPIRATION RATE: 18 BRPM | SYSTOLIC BLOOD PRESSURE: 115 MMHG | WEIGHT: 288 LBS | HEIGHT: 75 IN

## 2024-01-23 DIAGNOSIS — Z79.01 CURRENT USE OF LONG TERM ANTICOAGULATION: Primary | ICD-10-CM

## 2024-01-23 DIAGNOSIS — Z95.2 HISTORY OF MECHANICAL AORTIC VALVE REPLACEMENT: ICD-10-CM

## 2024-01-23 LAB
POC INR: 1.2
PROTHROMBIN TIME, POC: 13.8

## 2024-01-23 PROCEDURE — 99214 OFFICE O/P EST MOD 30 MIN: CPT | Performed by: STUDENT IN AN ORGANIZED HEALTH CARE EDUCATION/TRAINING PROGRAM

## 2024-01-23 PROCEDURE — 85610 PROTHROMBIN TIME: CPT | Performed by: STUDENT IN AN ORGANIZED HEALTH CARE EDUCATION/TRAINING PROGRAM

## 2024-01-23 RX ORDER — WARFARIN SODIUM 2 MG/1
TABLET ORAL
Qty: 90 TABLET | Refills: 1 | Status: SHIPPED | OUTPATIENT
Start: 2024-01-23

## 2024-01-23 RX ORDER — WARFARIN SODIUM 1 MG/1
TABLET ORAL
Qty: 90 TABLET | Refills: 1 | Status: SHIPPED | OUTPATIENT
Start: 2024-01-23

## 2024-01-23 ASSESSMENT — ENCOUNTER SYMPTOMS
RHINORRHEA: 0
ABDOMINAL PAIN: 0
COUGH: 0
SHORTNESS OF BREATH: 0
NAUSEA: 0
VOMITING: 0

## 2024-01-23 NOTE — PROGRESS NOTES
Assessment/Plan:     Diagnoses and all orders for this visit:    Current use of long term anticoagulation  -     AMB POC PT/INR  -     [facility]  -     warfarin (COUMADIN) 1 MG tablet; Take as directed based on INR protocol  -     warfarin (COUMADIN) 2 MG tablet; Take as directed based on INR protocol  -Patient has a history of an aortic mechanical valve on Coumadin for anticoagulation  -INR in office today is 1.2. INR goal 2-3, although not certain based on his type of valve (possible 1.5-2.5 goal), advised to schedule f/u with cardiology to take over management and determine goal    -No abnormal bleeding noted per patient   -Given INR is low discussed with patient how important it is to establish with cardiology for them to take over his Coumadin management  -Increase warfarin from 2 mg on Sun/Tue/Fri and 1 mg of Coumadin all other days to now 2 mg Sun/Tues/Wed/Fri and 1 mg all other days  -Discussed the risks of not having close follow-up for his INR's given how labile they are and the risks of not being within range    History of mechanical aortic valve replacement  -     [facility]  -     warfarin (COUMADIN) 1 MG tablet; Take as directed based on INR protocol  -     warfarin (COUMADIN) 2 MG tablet; Take as directed based on INR protocol  -See plan above   -Refer to cardiology          Return in about 1 week (around 1/30/2024), or if symptoms worsen or fail to improve.     Discussed expected course/resolution/complications of diagnosis in detail with patient.    Medication risks/benefits/costs/interactions/alternatives discussed with patient.    Pt expressed understanding with the diagnosis and plan      Subjective:      Addison Isaac is a 28 y.o. male who presents for had concerns including Coagulation Disorder (INR).     Current Outpatient Medications   Medication Sig Dispense Refill    warfarin (COUMADIN) 1 MG tablet Take as directed based on INR

## 2024-01-23 NOTE — PROGRESS NOTES
Room:  I have reviewed all needed documentation in preparation for visit. Verified patient by name and date of birth  Chief Complaint   Patient presents with    Coagulation Disorder     INR       Vitals:    01/23/24 1144   BP: 115/73   Pulse: 74   Resp: 18   SpO2: 97%   Weight: 130.6 kg (288 lb)   Height: 1.905 m (6' 3\")        Health Maintenance Due   Topic Date Due    Hepatitis B vaccine (1 of 3 - 3-dose series) Never done    Varicella vaccine (1 of 2 - 2-dose childhood series) Never done    HPV vaccine (1 - Male 2-dose series) Never done    HIV screen  Never done    Hepatitis C screen  Never done    DTaP/Tdap/Td vaccine (5 - Td or Tdap) 04/28/2018    Flu vaccine (1) Never done    COVID-19 Vaccine (2 - 2023-24 season) 09/01/2023        1. \"Have you been to the ER, urgent care clinic since your last visit?  Hospitalized since your last visit?\" No    2. \"Have you seen or consulted any other health care providers outside of the [facility] since your last visit?\" No

## 2024-01-30 ENCOUNTER — OFFICE VISIT (OUTPATIENT)
Facility: CLINIC | Age: 29
End: 2024-01-30
Payer: MEDICAID

## 2024-01-30 VITALS
OXYGEN SATURATION: 98 % | HEIGHT: 75 IN | HEART RATE: 60 BPM | TEMPERATURE: 97.5 F | BODY MASS INDEX: 35.36 KG/M2 | RESPIRATION RATE: 18 BRPM | WEIGHT: 284.4 LBS | SYSTOLIC BLOOD PRESSURE: 122 MMHG | DIASTOLIC BLOOD PRESSURE: 81 MMHG

## 2024-01-30 DIAGNOSIS — Z95.2 HISTORY OF MECHANICAL AORTIC VALVE REPLACEMENT: ICD-10-CM

## 2024-01-30 DIAGNOSIS — Z79.01 CURRENT USE OF LONG TERM ANTICOAGULATION: Primary | ICD-10-CM

## 2024-01-30 LAB
POC INR: 1.2
PROTHROMBIN TIME, POC: 14

## 2024-01-30 PROCEDURE — 85610 PROTHROMBIN TIME: CPT | Performed by: STUDENT IN AN ORGANIZED HEALTH CARE EDUCATION/TRAINING PROGRAM

## 2024-01-30 PROCEDURE — 99213 OFFICE O/P EST LOW 20 MIN: CPT | Performed by: STUDENT IN AN ORGANIZED HEALTH CARE EDUCATION/TRAINING PROGRAM

## 2024-01-30 RX ORDER — ATORVASTATIN CALCIUM 10 MG/1
10 TABLET, FILM COATED ORAL DAILY
Qty: 90 TABLET | Refills: 1 | Status: CANCELLED | OUTPATIENT
Start: 2024-01-30

## 2024-01-30 ASSESSMENT — PATIENT HEALTH QUESTIONNAIRE - PHQ9
10. IF YOU CHECKED OFF ANY PROBLEMS, HOW DIFFICULT HAVE THESE PROBLEMS MADE IT FOR YOU TO DO YOUR WORK, TAKE CARE OF THINGS AT HOME, OR GET ALONG WITH OTHER PEOPLE: 0
SUM OF ALL RESPONSES TO PHQ9 QUESTIONS 1 & 2: 1
5. POOR APPETITE OR OVEREATING: 0
SUM OF ALL RESPONSES TO PHQ QUESTIONS 1-9: 4
1. LITTLE INTEREST OR PLEASURE IN DOING THINGS: 0
6. FEELING BAD ABOUT YOURSELF - OR THAT YOU ARE A FAILURE OR HAVE LET YOURSELF OR YOUR FAMILY DOWN: 0
SUM OF ALL RESPONSES TO PHQ QUESTIONS 1-9: 4
4. FEELING TIRED OR HAVING LITTLE ENERGY: 1
2. FEELING DOWN, DEPRESSED OR HOPELESS: 1
7. TROUBLE CONCENTRATING ON THINGS, SUCH AS READING THE NEWSPAPER OR WATCHING TELEVISION: 0
9. THOUGHTS THAT YOU WOULD BE BETTER OFF DEAD, OR OF HURTING YOURSELF: 0
3. TROUBLE FALLING OR STAYING ASLEEP: 2
SUM OF ALL RESPONSES TO PHQ QUESTIONS 1-9: 4
8. MOVING OR SPEAKING SO SLOWLY THAT OTHER PEOPLE COULD HAVE NOTICED. OR THE OPPOSITE, BEING SO FIGETY OR RESTLESS THAT YOU HAVE BEEN MOVING AROUND A LOT MORE THAN USUAL: 0
SUM OF ALL RESPONSES TO PHQ QUESTIONS 1-9: 4

## 2024-01-30 ASSESSMENT — ENCOUNTER SYMPTOMS
ABDOMINAL PAIN: 0
COUGH: 0
NAUSEA: 0
RHINORRHEA: 0
SHORTNESS OF BREATH: 0
VOMITING: 0

## 2024-01-30 NOTE — PROGRESS NOTES
Patient identified with two identification factors, Name and Date of Birth.    Chief Complaint   Patient presents with    Follow-up     INR       /81 (Site: Left Upper Arm, Position: Sitting, Cuff Size: Large Adult)   Pulse 60   Temp 97.5 °F (36.4 °C) (Temporal)   Resp 18   Ht 1.905 m (6' 3\")   Wt 129 kg (284 lb 6.4 oz)   SpO2 98%   BMI 35.55 kg/m²       1. \"Have you been to the ER, urgent care clinic since your last visit?  Hospitalized since your last visit?\" No    2. \"Have you seen or consulted any other health care providers outside of the [facility] since your last visit?\" No      
shortness of breath.    Cardiovascular:  Negative for chest pain and leg swelling.   Gastrointestinal:  Negative for abdominal pain, nausea and vomiting.   Neurological:  Negative for dizziness, weakness, numbness and headaches.         Objective:     Vitals:    01/30/24 1149   BP: 122/81   Pulse: 60   Resp: 18   Temp: 97.5 °F (36.4 °C)   SpO2: 98%          Vitals and Nurse Documentation reviewed.     Physical Exam  Constitutional:       General: He is not in acute distress.     Appearance: Normal appearance. He is obese. He is not ill-appearing.   HENT:      Head: Normocephalic and atraumatic.   Eyes:      Conjunctiva/sclera: Conjunctivae normal.   Cardiovascular:      Rate and Rhythm: Normal rate and regular rhythm.      Heart sounds: Normal heart sounds. No murmur heard.     No friction rub. No gallop.      Comments: Systolic click appreciated on right and left sternal borders  Pulmonary:      Effort: Pulmonary effort is normal. No respiratory distress.      Breath sounds: Normal breath sounds. No wheezing, rhonchi or rales.   Abdominal:      General: Bowel sounds are normal. There is no distension.      Palpations: Abdomen is soft.      Tenderness: There is no abdominal tenderness. There is no guarding or rebound.   Musculoskeletal:      Cervical back: Neck supple.      Right lower leg: No edema.      Left lower leg: No edema.   Neurological:      Mental Status: He is alert and oriented to person, place, and time.         Results for orders placed or performed in visit on 01/30/24   AMB POC PT/INR   Result Value Ref Range    Prothrombin time, POC 14.0     POC INR 1.2

## 2024-02-06 ENCOUNTER — OFFICE VISIT (OUTPATIENT)
Facility: CLINIC | Age: 29
End: 2024-02-06
Payer: MEDICAID

## 2024-02-06 VITALS
TEMPERATURE: 97.7 F | DIASTOLIC BLOOD PRESSURE: 71 MMHG | RESPIRATION RATE: 16 BRPM | SYSTOLIC BLOOD PRESSURE: 122 MMHG | HEART RATE: 79 BPM | WEIGHT: 285 LBS | BODY MASS INDEX: 35.43 KG/M2 | HEIGHT: 75 IN | OXYGEN SATURATION: 98 %

## 2024-02-06 DIAGNOSIS — Z79.01 CURRENT USE OF LONG TERM ANTICOAGULATION: Primary | ICD-10-CM

## 2024-02-06 DIAGNOSIS — Z95.2 HISTORY OF MECHANICAL AORTIC VALVE REPLACEMENT: ICD-10-CM

## 2024-02-06 LAB
POC INR: 1.4
PROTHROMBIN TIME, POC: 16.8

## 2024-02-06 PROCEDURE — 85610 PROTHROMBIN TIME: CPT | Performed by: STUDENT IN AN ORGANIZED HEALTH CARE EDUCATION/TRAINING PROGRAM

## 2024-02-06 PROCEDURE — 99213 OFFICE O/P EST LOW 20 MIN: CPT | Performed by: STUDENT IN AN ORGANIZED HEALTH CARE EDUCATION/TRAINING PROGRAM

## 2024-02-06 ASSESSMENT — ENCOUNTER SYMPTOMS
RHINORRHEA: 0
ABDOMINAL PAIN: 0
NAUSEA: 0
VOMITING: 0
COUGH: 0
SHORTNESS OF BREATH: 0

## 2024-02-06 ASSESSMENT — PATIENT HEALTH QUESTIONNAIRE - PHQ9
SUM OF ALL RESPONSES TO PHQ QUESTIONS 1-9: 2
5. POOR APPETITE OR OVEREATING: 0
SUM OF ALL RESPONSES TO PHQ9 QUESTIONS 1 & 2: 2
9. THOUGHTS THAT YOU WOULD BE BETTER OFF DEAD, OR OF HURTING YOURSELF: 0
7. TROUBLE CONCENTRATING ON THINGS, SUCH AS READING THE NEWSPAPER OR WATCHING TELEVISION: 0
2. FEELING DOWN, DEPRESSED OR HOPELESS: 1
1. LITTLE INTEREST OR PLEASURE IN DOING THINGS: 1
8. MOVING OR SPEAKING SO SLOWLY THAT OTHER PEOPLE COULD HAVE NOTICED. OR THE OPPOSITE, BEING SO FIGETY OR RESTLESS THAT YOU HAVE BEEN MOVING AROUND A LOT MORE THAN USUAL: 0
6. FEELING BAD ABOUT YOURSELF - OR THAT YOU ARE A FAILURE OR HAVE LET YOURSELF OR YOUR FAMILY DOWN: 0
4. FEELING TIRED OR HAVING LITTLE ENERGY: 0
3. TROUBLE FALLING OR STAYING ASLEEP: 0
10. IF YOU CHECKED OFF ANY PROBLEMS, HOW DIFFICULT HAVE THESE PROBLEMS MADE IT FOR YOU TO DO YOUR WORK, TAKE CARE OF THINGS AT HOME, OR GET ALONG WITH OTHER PEOPLE: 0

## 2024-02-06 NOTE — PROGRESS NOTES
1. Have you been to the ER, urgent care clinic since your last visit?  Hospitalized since your last visit?No    2. Have you seen or consulted any other health care providers outside of the [facility] since your last visit?  Include any pap smears or colon screening. No    Chief Complaint   Patient presents with    Office Visit for Anticoagulation Management     Health Maintenance Due   Topic Date Due    Hepatitis B vaccine (1 of 3 - 3-dose series) Never done    Varicella vaccine (1 of 2 - 2-dose childhood series) Never done    HPV vaccine (1 - Male 2-dose series) Never done    HIV screen  Never done    Hepatitis C screen  Never done    DTaP/Tdap/Td vaccine (5 - Td or Tdap) 04/28/2018    Flu vaccine (1) Never done    COVID-19 Vaccine (2 - 2023-24 season) 09/01/2023     PHQ-9 Total Score: 0 (2/6/2024  8:08 AM)  Thoughts that you would be better off dead, or of hurting yourself in some way: 0 (2/6/2024  8:08 AM)        2/6/2024     8:00 AM   AMB Abuse Screening   Do you ever feel afraid of your partner? N   Are you in a relationship with someone who physically or mentally threatens you? N   Is it safe for you to go home? Y     Vitals:    02/06/24 0808   BP: 122/71   Pulse: 79   Resp: 16   Temp: 97.7 °F (36.5 °C)   SpO2: 98%           
 (5/9/2023)    Overall Financial Resource Strain (CARDIA)     Difficulty of Paying Living Expenses: Not hard at all   Food Insecurity: Not on file (5/9/2023)   Transportation Needs: Unknown (5/9/2023)    PRAPARE - Transportation     Lack of Transportation (Medical): Not on file     Lack of Transportation (Non-Medical): No   Physical Activity: Insufficiently Active (10/10/2022)    Exercise Vital Sign     Days of Exercise per Week: 1 day     Minutes of Exercise per Session: 10 min   Stress: Not on file   Social Connections: Not on file   Intimate Partner Violence: Not At Risk (10/10/2022)    Humiliation, Afraid, Rape, and Kick questionnaire     Fear of Current or Ex-Partner: No     Emotionally Abused: No     Physically Abused: No     Sexually Abused: No   Housing Stability: Unknown (5/9/2023)    Housing Stability Vital Sign     Unable to Pay for Housing in the Last Year: Not on file     Number of Places Lived in the Last Year: Not on file     Unstable Housing in the Last Year: No        Patient is a 28-year-old male who presents to the office today for INR check.  Patient has a mechanical heart valve with what I believe is an INR goal of 2-3.  Although given his heart valve specifics I do recommend evaluation by cardiology to confirm his INR goal as it could be 1.5-2.5 but may vary based on his risk factors.  Currently his INR is below goal at 1.4.  He has been taking 1 mg of warfarin on Saturday and 2 mg all other days.  Patient denies any abnormal bleeding.  He denies any dietary or medications changes. He denies any acute concerns. He is awaiting an appointment with his cardiologist.         ROS:   Review of Systems   Constitutional:  Negative for chills and fever.   HENT:  Negative for rhinorrhea.    Respiratory:  Negative for cough and shortness of breath.    Cardiovascular:  Negative for chest pain and leg swelling.   Gastrointestinal:  Negative for abdominal pain, nausea and vomiting.   Neurological:  Negative

## 2024-02-09 NOTE — PROGRESS NOTES
Elly Patel MD [facility]  Interventional & Structural Cardiology                         [facility]  [place], [place]    Structural & Interventional Cardiology Consult/Progress Note      Primary Care Provider: Viktoriya Chow MD   Referring provider: Viktoriya Chow MD  Date of Service: 02/09/24     Reason for Consult: AVR -On-X 25 mm anticoagulation    Assessment/Plan:  {There are no diagnoses linked to this encounter. (Refresh or delete this SmartLink)}     Investigations ordered    []    High complexity decision making was performed  []    Patient is at high-risk of decompensation with multiple organ involvement  []    Complex/difficult social determinants of health outcomes  Total of *** minutes were spent on reviewing the records, analyzing investigations and documentation in the chart, on the day of visit including time for examination and time spent with the patient  Investigations personally reviewed and interpreted      HPI: Addison Isaac, a 28 y.o. year-old who presents for evaluation of anticoagulation. Patient has been subtherapeutic for an extended period of  time.     Review of system: 14 review of system is negative except mentioned in HPI      Physical Exam  There were no vitals filed for this visit.   Wt Readings from Last 3 Encounters:   02/06/24 129.3 kg (285 lb)   01/30/24 129 kg (284 lb 6.4 oz)   01/23/24 130.6 kg (288 lb)      Estimated body surface area is 2.62 meters squared as calculated from the following:    Height as of 2/6/24: 1.905 m (6' 3\").    Weight as of 2/6/24: 129.3 kg (285 lb).     General:    Alert, cooperative, no distress.   Psychiatric:    Normal Mood and affect    Eye/ENT:      Pupils equal, No asymmetry, Conjunctival pink. Able to hear voice at normal amplitude   Lungs:      Visibly symmetric chest expansion, No palpable tenderness. Clear to auscultation bilaterally.    Heart::    Regular rate and rhythm, S1,

## 2024-02-12 ENCOUNTER — OFFICE VISIT (OUTPATIENT)
Age: 29
End: 2024-02-12
Payer: MEDICAID

## 2024-02-12 VITALS
OXYGEN SATURATION: 96 % | HEIGHT: 75 IN | SYSTOLIC BLOOD PRESSURE: 110 MMHG | HEART RATE: 70 BPM | WEIGHT: 286 LBS | DIASTOLIC BLOOD PRESSURE: 64 MMHG | RESPIRATION RATE: 16 BRPM | BODY MASS INDEX: 35.56 KG/M2

## 2024-02-12 DIAGNOSIS — Z79.01 CURRENT USE OF LONG TERM ANTICOAGULATION: ICD-10-CM

## 2024-02-12 DIAGNOSIS — Z95.2 HISTORY OF MECHANICAL AORTIC VALVE REPLACEMENT: Primary | ICD-10-CM

## 2024-02-12 DIAGNOSIS — R56.9 SEIZURES (HCC): ICD-10-CM

## 2024-02-12 DIAGNOSIS — B37.6: ICD-10-CM

## 2024-02-12 DIAGNOSIS — E78.2 MIXED HYPERLIPIDEMIA: ICD-10-CM

## 2024-02-12 PROBLEM — I33.0 INFECTIVE ENDOCARDITIS: Status: ACTIVE | Noted: 2024-02-12

## 2024-02-12 PROCEDURE — 99205 OFFICE O/P NEW HI 60 MIN: CPT | Performed by: INTERNAL MEDICINE

## 2024-02-12 PROCEDURE — 93005 ELECTROCARDIOGRAM TRACING: CPT | Performed by: INTERNAL MEDICINE

## 2024-02-12 RX ORDER — LANOLIN ALCOHOL/MO/W.PET/CERES
5 CREAM (GRAM) TOPICAL NIGHTLY PRN
COMMUNITY

## 2024-02-12 NOTE — PROGRESS NOTES
Echo entered per [facility] Dr. Patel Dx: H/O Valve replacement    Med Management Clinic referral placed per [facility] Dr. Patel Dx: H/O Valve replacement with INR goal 1.5-2.0    Atorvastatin stopped per [facility] of Dr. Patel.

## 2024-02-12 NOTE — PROGRESS NOTES
Elly Patel MD [facility]  Interventional & Structural Cardiology                         [facility]  [place], [place]          Structural & Interventional Cardiology Consult/Progress Note      Primary Care Provider: Viktoriya Chow MD   Referring provider: Viktoriya Chow MD  Date of Service: 02/12/24     Reason for Consult:    Assessment/Plan:  1. 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  I reviewed his echo report from 2021.  No active signs of infection.  Repeat echocardiogram.  Lifelong SBE prophylaxis.  He is maintained on chronic fluconazole suppressive therapy by infectious disease.  The patient is unable to maintain a consistent INR level (goal 1.5-2).  We will arrange for him to have more anticoagulation clinic and hopefully in future home testing.    Orders:  -     EKG 12 Lead  -     [facility] - Medication Management (Anticoagulation) [facility], [place]  -     ECHO COMPLETE ADULT (TTE) W OR WO CONTR- Clinic Performed; Future  2. Current use of long term anticoagulation  Overview:  Coumadin clinic as above.  Goal 1.5-2  Hopefully future home monitoring.  3. Mixed hyperlipidemia  Overview:  The patient is maintained on low-dose atorvastatin.  Patient have high triglycerides on his previous labs.  Patient was not sure if he was fasting during his labs.  Will stop atorvastatin this point.  We will repeat lipids.  If he still have a hypertriglyceridemia we will consider fenofibrate.  4. Seizures (HCC)  Overview:  The patient does not have a driving license.  He is followed by [facility].  Your prior embolic strokes.  5. Candidal endocarditis, unspecified chronicity  Overview:  Fungal IE. Post AVR  Lifelong SBE prophylaxis.       Investigations ordered    []    High complexity decision making was performed  []    Patient is at high-risk of decompensation with

## 2024-02-13 ENCOUNTER — OFFICE VISIT (OUTPATIENT)
Facility: CLINIC | Age: 29
End: 2024-02-13
Payer: MEDICAID

## 2024-02-13 VITALS
WEIGHT: 286 LBS | HEART RATE: 92 BPM | DIASTOLIC BLOOD PRESSURE: 71 MMHG | RESPIRATION RATE: 20 BRPM | OXYGEN SATURATION: 98 % | HEIGHT: 75 IN | BODY MASS INDEX: 35.56 KG/M2 | SYSTOLIC BLOOD PRESSURE: 105 MMHG | TEMPERATURE: 97.7 F

## 2024-02-13 DIAGNOSIS — E78.2 MIXED HYPERLIPIDEMIA: ICD-10-CM

## 2024-02-13 DIAGNOSIS — R56.9 SEIZURES (HCC): ICD-10-CM

## 2024-02-13 DIAGNOSIS — Z79.01 CURRENT USE OF LONG TERM ANTICOAGULATION: Primary | ICD-10-CM

## 2024-02-13 DIAGNOSIS — Z95.2 HISTORY OF MECHANICAL AORTIC VALVE REPLACEMENT: ICD-10-CM

## 2024-02-13 LAB
POC INR: 1.4
PROTHROMBIN TIME, POC: 16.5

## 2024-02-13 PROCEDURE — 99213 OFFICE O/P EST LOW 20 MIN: CPT | Performed by: STUDENT IN AN ORGANIZED HEALTH CARE EDUCATION/TRAINING PROGRAM

## 2024-02-13 PROCEDURE — 85610 PROTHROMBIN TIME: CPT | Performed by: STUDENT IN AN ORGANIZED HEALTH CARE EDUCATION/TRAINING PROGRAM

## 2024-02-13 NOTE — PROGRESS NOTES
dentified pt with two pt identifiers(name and ).    Chief Complaint   Patient presents with    Anticoagulation     Today's INR 1.4        Health Maintenance Due   Topic    Hepatitis B vaccine (1 of 3 - 3-dose series)    Varicella vaccine (1 of 2 - 2-dose childhood series)    HPV vaccine (1 - Male 2-dose series)    HIV screen     Hepatitis C screen     DTaP/Tdap/Td vaccine (5 - Td or Tdap)    Flu vaccine (1)    COVID-19 Vaccine (2 -  season)       Wt Readings from Last 3 Encounters:   24 129.7 kg (286 lb)   24 129.7 kg (286 lb)   24 129.3 kg (285 lb)     Temp Readings from Last 3 Encounters:   24 97.7 °F (36.5 °C) (Temporal)   24 97.7 °F (36.5 °C) (Skin)   24 97.5 °F (36.4 °C) (Temporal)     BP Readings from Last 3 Encounters:   24 105/71   24 110/64   24 122/71     Pulse Readings from Last 3 Encounters:   24 92   24 70   24 79           Coordination of Care Questionnaire:  :   1. \"Have you been to the ER, urgent care clinic since your last visit?  Hospitalized since your last visit?\" no    2. \"Have you seen or consulted any other health care providers outside of the [facility] since your last visit?\" no     3. For patients aged 45-75: Has the patient had a colonoscopy / FIT/ Cologuard? no      If the patient is female:    4. For patients aged 40-74: Has the patient had a mammogram within the past 2 years? no      5. For patients aged 21-65: Has the patient had a pap smear? no     3) Do you have an Advance Directive on file? no  Are you interested in receiving information about Advance Directives? no    Patient is accompanied by self I have received verbal consent from Addison Isaac to discuss any/all medical information while they are present in the room.   
Determinants of Health     Financial Resource Strain: Low Risk  (5/9/2023)    Overall Financial Resource Strain (CARDIA)     Difficulty of Paying Living Expenses: Not hard at all   Food Insecurity: Not on file (5/9/2023)   Transportation Needs: Unknown (5/9/2023)    PRAPARE - Transportation     Lack of Transportation (Medical): Not on file     Lack of Transportation (Non-Medical): No   Physical Activity: Insufficiently Active (10/10/2022)    Exercise Vital Sign     Days of Exercise per Week: 1 day     Minutes of Exercise per Session: 10 min   Stress: Not on file   Social Connections: Not on file   Intimate Partner Violence: Not At Risk (10/10/2022)    Humiliation, Afraid, Rape, and Kick questionnaire     Fear of Current or Ex-Partner: No     Emotionally Abused: No     Physically Abused: No     Sexually Abused: No   Housing Stability: Unknown (5/9/2023)    Housing Stability Vital Sign     Unable to Pay for Housing in the Last Year: Not on file     Number of Places Lived in the Last Year: Not on file     Unstable Housing in the Last Year: No        Patient is a 28-year-old male who presents to the office today for INR check.  Patient has a mechanical heart valve with an INR goal 1.5-2.  He did recently establish with cardiology and this is what his new recommendations for INR goals are based on cardiology recommendations.  He also states that he will be scheduling with their Coumadin clinic for monitoring.  Currently his INR is below goal at 1.4.  He has been taking 3 mg of warfarin on Tuesday and 2 mg all other days.  Patient denies any abnormal bleeding.  He denies any dietary or medications changes. He denies any acute concerns.           ROS:   Review of Systems   Constitutional:  Negative for chills and fever.   HENT:  Negative for rhinorrhea.    Respiratory:  Negative for cough and shortness of breath.    Cardiovascular:  Negative for chest pain and leg swelling.   Gastrointestinal:  Negative for abdominal pain,

## 2024-02-14 LAB
ALBUMIN SERPL-MCNC: 3.6 G/DL (ref 3.5–5)
ALBUMIN/GLOB SERPL: 0.9 (ref 1.1–2.2)
ALP SERPL-CCNC: 81 U/L (ref 45–117)
ALT SERPL-CCNC: 18 U/L (ref 12–78)
AST SERPL-CCNC: 19 U/L (ref 15–37)
BASOPHILS # BLD: 0 K/UL (ref 0–0.1)
BASOPHILS NFR BLD: 1 % (ref 0–1)
BILIRUB SERPL-MCNC: 0.6 MG/DL (ref 0.2–1)
BUN SERPL-MCNC: 21 MG/DL (ref 6–20)
BUN/CREAT SERPL: 16 (ref 12–20)
CALCIUM SERPL-MCNC: 9.1 MG/DL (ref 8.5–10.1)
CHLORIDE SERPL-SCNC: 106 MMOL/L (ref 97–108)
CHOLEST SERPL-MCNC: 174 MG/DL
CO2 SERPL-SCNC: 26 MMOL/L (ref 21–32)
CREAT SERPL-MCNC: 1.28 MG/DL (ref 0.7–1.3)
DIFFERENTIAL METHOD BLD: ABNORMAL
EOSINOPHIL # BLD: 0.1 K/UL (ref 0–0.4)
EOSINOPHIL NFR BLD: 2 % (ref 0–7)
ERYTHROCYTE [DISTWIDTH] IN BLOOD BY AUTOMATED COUNT: 15.9 % (ref 11.5–14.5)
GLOBULIN SER CALC-MCNC: 3.8 G/DL (ref 2–4)
GLUCOSE SERPL-MCNC: 111 MG/DL (ref 65–100)
HCT VFR BLD AUTO: 39.3 % (ref 36.6–50.3)
HDLC SERPL-MCNC: 27 MG/DL
HDLC SERPL: 6.4 (ref 0–5)
HGB BLD-MCNC: 13 G/DL (ref 12.1–17)
IMM GRANULOCYTES # BLD AUTO: 0.1 K/UL (ref 0–0.04)
IMM GRANULOCYTES NFR BLD AUTO: 1 % (ref 0–0.5)
LDLC SERPL CALC-MCNC: 107.6 MG/DL (ref 0–100)
LYMPHOCYTES # BLD: 1 K/UL (ref 0.8–3.5)
LYMPHOCYTES NFR BLD: 19 % (ref 12–49)
MCH RBC QN AUTO: 26.2 PG (ref 26–34)
MCHC RBC AUTO-ENTMCNC: 33.1 G/DL (ref 30–36.5)
MCV RBC AUTO: 79.2 FL (ref 80–99)
MONOCYTES # BLD: 0.5 K/UL (ref 0–1)
MONOCYTES NFR BLD: 9 % (ref 5–13)
NEUTS SEG # BLD: 3.6 K/UL (ref 1.8–8)
NEUTS SEG NFR BLD: 68 % (ref 32–75)
NRBC # BLD: 0 K/UL (ref 0–0.01)
NRBC BLD-RTO: 0 PER 100 WBC
PLATELET # BLD AUTO: 139 K/UL (ref 150–400)
PMV BLD AUTO: 10.6 FL (ref 8.9–12.9)
POTASSIUM SERPL-SCNC: 3.9 MMOL/L (ref 3.5–5.1)
PROT SERPL-MCNC: 7.4 G/DL (ref 6.4–8.2)
RBC # BLD AUTO: 4.96 M/UL (ref 4.1–5.7)
SODIUM SERPL-SCNC: 136 MMOL/L (ref 136–145)
TRIGL SERPL-MCNC: 197 MG/DL
VLDLC SERPL CALC-MCNC: 39.4 MG/DL
WBC # BLD AUTO: 5.2 K/UL (ref 4.1–11.1)

## 2024-03-04 ENCOUNTER — PATIENT MESSAGE (OUTPATIENT)
Age: 29
End: 2024-03-04

## 2024-03-12 ENCOUNTER — ANCILLARY PROCEDURE (OUTPATIENT)
Age: 29
End: 2024-03-12
Payer: MEDICAID

## 2024-03-12 VITALS
HEART RATE: 59 BPM | DIASTOLIC BLOOD PRESSURE: 68 MMHG | HEIGHT: 75 IN | SYSTOLIC BLOOD PRESSURE: 118 MMHG | BODY MASS INDEX: 35.56 KG/M2 | RESPIRATION RATE: 2 BRPM | WEIGHT: 286 LBS

## 2024-03-12 DIAGNOSIS — Z79.01 CURRENT USE OF LONG TERM ANTICOAGULATION: ICD-10-CM

## 2024-03-12 DIAGNOSIS — Z95.2 HISTORY OF MECHANICAL AORTIC VALVE REPLACEMENT: ICD-10-CM

## 2024-03-12 PROCEDURE — C8929 TTE W OR WO FOL WCON,DOPPLER: HCPCS | Performed by: INTERNAL MEDICINE

## 2024-03-12 RX ADMIN — PERFLUTREN 1.3 ML: 6.52 INJECTION, SUSPENSION INTRAVENOUS at 08:35

## 2024-03-12 NOTE — TELEPHONE ENCOUNTER
No chief complaint on file.      Requested Prescriptions     Pending Prescriptions Disp Refills    warfarin (COUMADIN) 1 MG tablet 90 tablet 1     Sig: Take as directed based on INR protocol       Allergies:  Allergies   Allergen Reactions    Penicillins Rash, Swelling and Other (See Comments)     Reaction Type: Allergy; Reaction(s): unspecified reaction as a baby       Last visit with clinic:  2/13/2024   Next visit with clinic: Visit date not found     Last visit with this provider: 2/13/2024   Next Visit with this provider: Visit date not found    Signed by Jenna Prakash MA [facility]  03/12/24  10:59 AM

## 2024-03-13 LAB
ECHO AO ASC DIAM: 2.9 CM
ECHO AO ASCENDING AORTA INDEX: 1.14 CM/M2
ECHO AV MEAN GRADIENT: 17 MMHG
ECHO AV MEAN VELOCITY: 2 M/S
ECHO AV PEAK GRADIENT: 27 MMHG
ECHO AV PEAK VELOCITY: 2.6 M/S
ECHO AV VELOCITY RATIO: 0.42
ECHO AV VTI: 48.4 CM
ECHO BSA: 2.62 M2
ECHO LV E' LATERAL VELOCITY: 10 CM/S
ECHO LV E' SEPTAL VELOCITY: 10 CM/S
ECHO LV EDV A2C: 120 ML
ECHO LV EDV A4C: 138 ML
ECHO LV EDV BP: 131 ML (ref 67–155)
ECHO LV EDV INDEX A4C: 54 ML/M2
ECHO LV EDV INDEX BP: 51 ML/M2
ECHO LV EDV NDEX A2C: 47 ML/M2
ECHO LV EJECTION FRACTION A2C: 60 %
ECHO LV EJECTION FRACTION A4C: 58 %
ECHO LV EJECTION FRACTION BIPLANE: 59 % (ref 55–100)
ECHO LV ESV A2C: 48 ML
ECHO LV ESV A4C: 58 ML
ECHO LV ESV BP: 53 ML (ref 22–58)
ECHO LV ESV INDEX A2C: 19 ML/M2
ECHO LV ESV INDEX A4C: 23 ML/M2
ECHO LV ESV INDEX BP: 21 ML/M2
ECHO LV FRACTIONAL SHORTENING: 29 % (ref 28–44)
ECHO LV INTERNAL DIMENSION DIASTOLE INDEX: 1.49 CM/M2
ECHO LV INTERNAL DIMENSION DIASTOLIC: 3.8 CM (ref 4.2–5.9)
ECHO LV INTERNAL DIMENSION SYSTOLIC INDEX: 1.06 CM/M2
ECHO LV INTERNAL DIMENSION SYSTOLIC: 2.7 CM
ECHO LV IVSD: 1.2 CM (ref 0.6–1)
ECHO LV MASS 2D: 134.7 G (ref 88–224)
ECHO LV MASS INDEX 2D: 52.8 G/M2 (ref 49–115)
ECHO LV POSTERIOR WALL DIASTOLIC: 1 CM (ref 0.6–1)
ECHO LV RELATIVE WALL THICKNESS RATIO: 0.53
ECHO LVOT AV VTI INDEX: 0.45
ECHO LVOT MEAN GRADIENT: 3 MMHG
ECHO LVOT PEAK GRADIENT: 5 MMHG
ECHO LVOT PEAK VELOCITY: 1.1 M/S
ECHO LVOT VTI: 21.9 CM
ECHO MV A VELOCITY: 0.62 M/S
ECHO MV AREA PHT: 3.3 CM2
ECHO MV E DECELERATION TIME (DT): 229.8 MS
ECHO MV E VELOCITY: 0.71 M/S
ECHO MV E/A RATIO: 1.15
ECHO MV E/E' LATERAL: 7.1
ECHO MV E/E' RATIO (AVERAGED): 7.1
ECHO MV PRESSURE HALF TIME (PHT): 66.6 MS
ECHO RV FREE WALL PEAK S': 13 CM/S
ECHO RV TAPSE: 1.7 CM (ref 1.7–?)

## 2024-03-13 RX ORDER — WARFARIN SODIUM 1 MG/1
TABLET ORAL
Qty: 90 TABLET | Refills: 1 | Status: SHIPPED | OUTPATIENT
Start: 2024-03-13

## 2024-03-15 NOTE — PROGRESS NOTES
Pharmacy Progress Note - Anticoagulation Management    S/O:  Mr. Addison Isaac  is a 29 y.o. male seen today for anticoagulation management for the diagnosis of Aortic Valve Replacement.     HPI: Patient referred to the [facility] for anticoagulation services for an On-X mechanical valve replacement.    Interim History:    Warfarin start date: 10/1/21 (per chart review)  INR Goal:  1.5-2.0  (On-X) - confirmed by Amanda Randhawa RN 3/19/24  Current warfarin regimen: 3 mg Tue, Fri; 2 mg daily ROW                      Warfarin tablet strength:   1 mg, 2 mg   Duration of therapy: Indefinite      Results for orders placed or performed in visit on 03/19/24   POCT INR   Result Value Ref Range    INR,(POC) 2.5 (A) 1.5 - 2.0       Today's pertinent positives includes:  Medication change    Patient reports taking ibuprofen one day last week for a migraine. Patient reports only ibuprofen works for his migraines (not APAP).    Adherence:   Able to recall regimen? YES  Miss/extra dose? NO  Need refill? 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

## 2024-03-15 NOTE — PATIENT INSTRUCTIONS
Today your INR was 2.5.      Your goal INR is  1.5-2.0 .    You have a   1 mg and 2 mg tablet of Coumadin (warfarin).   Take Coumadin (warfarin) as follows:    Take 3 mg every Friday; and 2 mg all other days of the week.    Come back in 2 week(s) for your next finger stick/INR blood test.        Avoid any over the counter items containing aspirin or ibuprofen, and avoid great swings in general diet.  Avoid alcohol consumption.  Please notify the INR pharmacist if you are started on any new medication including over the counter or herbal supplements. Also, please notify your INR pharmacist if any of your other prescription or over the counter medications have been discontinued.     Call [facility] at [phone] if you have any signs of abnormal bleeding/blood clot.  ------------------------------------------------------------------------------------------------------------------  Taking Warfarin Safely: Care Instructions    Your Care Instructions  Warfarin is a medicine that you take to prevent blood clots. It is often called a blood thinner. Doctors give warfarin (such as Coumadin) to reduce the risk of blood clots. You may be at risk for blood clots if you have atrial fibrillation or deep vein thrombosis. Some other health problems may also put you at risk.  Warfarin slows the amount of time it takes for your blood to clot. It can cause bleeding problems. Even if you've been taking warfarin for a while, it's important to know how to take it safely.  Foods and other medicines can affect the way warfarin works. Some can make warfarin work too well. This can cause bleeding problems. And some can make it work poorly, so that it does not prevent blood clots very well.  You will need regular blood tests to check how long it takes for your blood to form a clot. This test is called a PT or prothrombin time test. The result of the test is called an INR level. Depending on the 
Number Of Freeze-Thaw Cycles: 2 freeze-thaw cycles
Render Post-Care Instructions In Note?: yes
Duration Of Freeze Thaw-Cycle (Seconds): 6
Consent: The patient's verbal consent was obtained including but not limited to risks of crusting, scabbing, blistering, scarring, darker or lighter pigmentary change, recurrence, incomplete removal and infection.
Total Number Of Aks Treated: 5
Post-Care Instructions: I reviewed with the patient in detail post-care instructions. Patient is to wear sunprotection, and avoid picking at any of the treated lesions. Pt may apply Vaseline to crusted or scabbing areas.
Render In Bullet Format When Appropriate: No
Detail Level: Zone

## 2024-03-19 ENCOUNTER — ANTI-COAG VISIT (OUTPATIENT)
Facility: HOSPITAL | Age: 29
End: 2024-03-19
Payer: MEDICAID

## 2024-03-19 DIAGNOSIS — Z95.2 HISTORY OF MECHANICAL AORTIC VALVE REPLACEMENT: Primary | ICD-10-CM

## 2024-03-19 DIAGNOSIS — Z79.01 CURRENT USE OF LONG TERM ANTICOAGULATION: ICD-10-CM

## 2024-03-19 LAB — INTERNATIONAL NORMALIZATION RATIO, POC: 2.5 (ref 1.5–2)

## 2024-03-19 PROCEDURE — 99213 OFFICE O/P EST LOW 20 MIN: CPT

## 2024-03-19 PROCEDURE — 85610 PROTHROMBIN TIME: CPT

## 2024-03-19 RX ORDER — WARFARIN SODIUM 1 MG/1
TABLET ORAL
Qty: 30 TABLET | Refills: 1 | Status: SHIPPED | OUTPATIENT
Start: 2024-03-19

## 2024-03-19 RX ORDER — WARFARIN SODIUM 2 MG/1
TABLET ORAL
Qty: 100 TABLET | Refills: 1 | Status: SHIPPED | OUTPATIENT
Start: 2024-03-19

## 2024-03-25 ENCOUNTER — OFFICE VISIT (OUTPATIENT)
Age: 29
End: 2024-03-25
Payer: MEDICAID

## 2024-03-25 VITALS
HEART RATE: 70 BPM | OXYGEN SATURATION: 97 % | SYSTOLIC BLOOD PRESSURE: 118 MMHG | BODY MASS INDEX: 34.19 KG/M2 | HEIGHT: 75 IN | DIASTOLIC BLOOD PRESSURE: 60 MMHG | RESPIRATION RATE: 18 BRPM | WEIGHT: 275 LBS

## 2024-03-25 DIAGNOSIS — R56.9 SEIZURES (HCC): ICD-10-CM

## 2024-03-25 DIAGNOSIS — Z79.01 CURRENT USE OF LONG TERM ANTICOAGULATION: ICD-10-CM

## 2024-03-25 DIAGNOSIS — Z95.2 HISTORY OF MECHANICAL AORTIC VALVE REPLACEMENT: Primary | ICD-10-CM

## 2024-03-25 DIAGNOSIS — E78.2 MIXED HYPERLIPIDEMIA: ICD-10-CM

## 2024-03-25 PROCEDURE — 99214 OFFICE O/P EST MOD 30 MIN: CPT | Performed by: INTERNAL MEDICINE

## 2024-03-25 NOTE — PROGRESS NOTES
Elly Patel MD [facility]  Interventional & Structural Cardiology                         [facility]  [place], [place]          Structural & Interventional Cardiology Consult/Progress Note      Primary Care Provider: Viktoriya Chow MD   Referring provider: No ref. provider found  Date of Service: 03/25/24     Reason for Consult:    Assessment/Plan:  1. 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  Echocardiogram showing well-functioning mechanical prosthesis.    Lifelong SBE prophylaxis.  He is maintained on chronic fluconazole suppressive therapy by infectious disease.  The patient is unable to maintain a consistent INR level (goal 1.5-2).  The patient is now following up with the warfarin clinic.  He will inquire about home monitor.       2. Current use of long term anticoagulation    Coumadin clinic as above.  Goal 1.5-2  Hopefully future home monitoring.  3. Mixed hyperlipidemia    Patient is following with excellent primary care physician and scheduled to have repeat lipids.  4. Seizures (HCC)  Denies any recent seizure activity.  The patient does not have a driving license.  He is followed by [facility].  Your prior embolic strokes.  Patient mentioned present in the field somewhat tired in the morning.  He will discuss with primary care physician if the dose can be lowered.  5. Candidal endocarditis, unspecified chronicity    Fungal IE. Post AVR  Lifelong SBE prophylaxis.      Investigations personally reviewed and interpreted      HPI:28-year-old gentleman with history of IV drug abuse, history of fungal endocarditis of aortic valve postsurgical mechanical valve replacement with On-X and aortic valve debridement in 2021, prior CVA, seizure disorder, chronic warfarin therapy, major depression, opioid dependence, came for follow-up.  Patient's echocardiogram was done

## 2024-04-01 NOTE — PATIENT INSTRUCTIONS
Today your INR was 2.0 .      Your goal INR is  1.5-2.0 .    You have a   1 mg and 2 mg tablet of Coumadin (warfarin).   Take Coumadin (warfarin) as follows:    Take 3 mg every Friday; and 2 mg all other days of the week.    Come back in 3 week(s) for your next finger stick/INR blood test.        Avoid any over the counter items containing aspirin or ibuprofen, and avoid great swings in general diet.  Avoid alcohol consumption.  Please notify the INR pharmacist if you are started on any new medication including over the counter or herbal supplements. Also, please notify your INR pharmacist if any of your other prescription or over the counter medications have been discontinued.     Call [facility] at [phone] if you have any signs of abnormal bleeding/blood clot.  ------------------------------------------------------------------------------------------------------------------  Taking Warfarin Safely: Care Instructions    Your Care Instructions  Warfarin is a medicine that you take to prevent blood clots. It is often called a blood thinner. Doctors give warfarin (such as Coumadin) to reduce the risk of blood clots. You may be at risk for blood clots if you have atrial fibrillation or deep vein thrombosis. Some other health problems may also put you at risk.  Warfarin slows the amount of time it takes for your blood to clot. It can cause bleeding problems. Even if you've been taking warfarin for a while, it's important to know how to take it safely.  Foods and other medicines can affect the way warfarin works. Some can make warfarin work too well. This can cause bleeding problems. And some can make it work poorly, so that it does not prevent blood clots very well.  You will need regular blood tests to check how long it takes for your blood to form a clot. This test is called a PT or prothrombin time test. The result of the test is called an INR level. Depending on the

## 2024-04-01 NOTE — PROGRESS NOTES
Pharmacy Progress Note - Anticoagulation Management    S/O:  Mr. Addison Isaac  is a 29 y.o. male seen today for anticoagulation management for the diagnosis of Aortic Valve Replacement.     HPI: At last visit (3/19), the patient's INR was 2.5 and supratherapeutic. Patient denied any extra doses of warfarin. Patient reported consistent intake of vitamin K foods. Patient reported taking ibuprofen one day last week for a migraine. Patient reported only ibuprofen works for his migraines (not APAP). Will reduce weekly dose from 16 mg to 15 mg (~ 6%) and patient will take warfarin 3 mg Fri; 2 mg daily ROW. Patient informed APAP is safer to use with warfarin. Patient reports he uses APAP for other pains just not for a migraine. Patient will recheck INR in 2 week(s).    Interim History:    Warfarin start date: 10/1/21 (per chart review)  INR Goal:  1.5-2.0  (On-X) - confirmed by Amanda Randhawa RN 3/19/24  Current warfarin regimen: 3 mg Fri; 2 mg daily ROW    Warfarin tablet strength:   1 mg, 2 mg   Duration of therapy: Indefinite      Results for orders placed or performed in visit on 04/02/24   POCT INR   Result Value Ref Range    INR,(POC) 2.0 1.5 - 2       Today's pertinent positives includes:  No significant changes since last visit      Adherence:   Able to recall regimen? YES  Miss/extra dose? NO  Need refill? 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

## 2024-04-02 ENCOUNTER — ANTI-COAG VISIT (OUTPATIENT)
Facility: HOSPITAL | Age: 29
End: 2024-04-02
Payer: MEDICAID

## 2024-04-02 DIAGNOSIS — Z95.2 HISTORY OF MECHANICAL AORTIC VALVE REPLACEMENT: Primary | ICD-10-CM

## 2024-04-02 DIAGNOSIS — Z79.01 CURRENT USE OF LONG TERM ANTICOAGULATION: ICD-10-CM

## 2024-04-02 LAB — INTERNATIONAL NORMALIZATION RATIO, POC: 2 (ref 1.5–2)

## 2024-04-02 PROCEDURE — 85610 PROTHROMBIN TIME: CPT

## 2024-04-02 PROCEDURE — 99211 OFF/OP EST MAY X REQ PHY/QHP: CPT

## 2024-04-09 NOTE — PROGRESS NOTES
Pharmacy Progress Note - Anticoagulation Management    S/O:  Mr. Addison Isaac  is a 29 y.o. male seen today for anticoagulation management for the diagnosis of Aortic Valve Replacement.     HPI: At last visit (4/2), the patient's INR was 2.0 and therapeutic. Patient denied any changes to his medications and reported consistent intake of vitamin K foods. Patient will continue current warfarin regimen of 3 mg Fri; 2 mg daily ROW and recheck INR in 3 week(s).    Interim History:    Warfarin start date: 10/1/21 (per chart review)  INR Goal:  1.5-2.0  (On-X) - confirmed by Amanda Randhawa RN 3/19/24  Current warfarin regimen: 3 mg Fri; 2 mg daily ROW    Warfarin tablet strength:   1 mg, 2 mg   Duration of therapy: Indefinite      Results for orders placed or performed in visit on 04/23/24   POCT INR   Result Value Ref Range    INR,(POC) 1.2 (A) 1.5 - 2       Today's pertinent positives includes:  Medication change    Patient reports he has not taken fluconazole in a couple of weeks due to waiting on his provider to authorize refills on the prescription.    Adherence:   Able to recall regimen? YES  Miss/extra dose? NO  Need refill? 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

## 2024-04-09 NOTE — PATIENT INSTRUCTIONS
Today your INR was 1.2 .      Your goal INR is  1.5-2.0 .    You have a   1 mg and 2 mg tablet of Coumadin (warfarin).   Take Coumadin (warfarin) as follows:    Take 3 mg every Tuesday, Friday; and 2 mg all other days of the week.    Come back in 3 week(s) for your next finger stick/INR blood test.        Avoid any over the counter items containing aspirin or ibuprofen, and avoid great swings in general diet.  Avoid alcohol consumption.  Please notify the INR pharmacist if you are started on any new medication including over the counter or herbal supplements. Also, please notify your INR pharmacist if any of your other prescription or over the counter medications have been discontinued.     Call [facility] at [phone] if you have any signs of abnormal bleeding/blood clot.  ------------------------------------------------------------------------------------------------------------------  Taking Warfarin Safely: Care Instructions    Your Care Instructions  Warfarin is a medicine that you take to prevent blood clots. It is often called a blood thinner. Doctors give warfarin (such as Coumadin) to reduce the risk of blood clots. You may be at risk for blood clots if you have atrial fibrillation or deep vein thrombosis. Some other health problems may also put you at risk.  Warfarin slows the amount of time it takes for your blood to clot. It can cause bleeding problems. Even if you've been taking warfarin for a while, it's important to know how to take it safely.  Foods and other medicines can affect the way warfarin works. Some can make warfarin work too well. This can cause bleeding problems. And some can make it work poorly, so that it does not prevent blood clots very well.  You will need regular blood tests to check how long it takes for your blood to form a clot. This test is called a PT or prothrombin time test. The result of the test is called an INR level.  Island Pedicle Flap Text: The defect edges were debeveled with a #15 scalpel blade.  Given the location of the defect, shape of the defect and the proximity to free margins an island pedicle advancement flap was deemed most appropriate.  Using a sterile surgical marker, an appropriate advancement flap was drawn incorporating the defect, outlining the appropriate donor tissue and placing the expected incisions within the relaxed skin tension lines where possible.    The area thus outlined was incised deep to adipose tissue with a #15 scalpel blade.  The skin margins were undermined to an appropriate distance in all directions around the primary defect and laterally outward around the island pedicle utilizing iris scissors.  There was minimal undermining beneath the pedicle flap.

## 2024-04-23 ENCOUNTER — ANTI-COAG VISIT (OUTPATIENT)
Facility: HOSPITAL | Age: 29
End: 2024-04-23
Payer: MEDICAID

## 2024-04-23 DIAGNOSIS — Z79.01 CURRENT USE OF LONG TERM ANTICOAGULATION: ICD-10-CM

## 2024-04-23 DIAGNOSIS — Z95.2 HISTORY OF MECHANICAL AORTIC VALVE REPLACEMENT: Primary | ICD-10-CM

## 2024-04-23 LAB — INTERNATIONAL NORMALIZATION RATIO, POC: 1.2 (ref 1.5–2)

## 2024-04-23 PROCEDURE — 99212 OFFICE O/P EST SF 10 MIN: CPT

## 2024-04-23 PROCEDURE — 85610 PROTHROMBIN TIME: CPT

## 2024-05-02 DIAGNOSIS — G47.09 OTHER INSOMNIA: ICD-10-CM

## 2024-05-02 RX ORDER — TRAZODONE HYDROCHLORIDE 50 MG/1
50 TABLET ORAL NIGHTLY
Qty: 90 TABLET | Refills: 1 | Status: SHIPPED | OUTPATIENT
Start: 2024-05-02

## 2024-05-02 NOTE — TELEPHONE ENCOUNTER
PCP: Viktoriya Chow MD    Last appt: [unfilled]  Future Appointments   Date Time Provider Department Center   5/14/2024  9:00 AM [facility] MEDICATION MGMT [facility]   3/25/2025  8:40 AM Jamin Gaffney MD [facility]       Requested Prescriptions     Pending Prescriptions Disp Refills    traZODone (DESYREL) 50 MG tablet 90 tablet 1     Sig: Take 1 tablet by mouth nightly       Prior labs and Blood pressures:  BP Readings from Last 3 Encounters:   03/25/24 118/60   03/12/24 118/68   02/13/24 105/71     Lab Results   Component Value Date/Time     02/13/2024 08:33 AM    K 3.9 02/13/2024 08:33 AM     02/13/2024 08:33 AM    CO2 26 02/13/2024 08:33 AM    BUN 21 02/13/2024 08:33 AM     No results found for: \"HBA1C\", \"[id number]\"  Lab Results   Component Value Date/Time    CHOL 174 02/13/2024 08:33 AM    HDL 27 02/13/2024 08:33 AM    .6 02/13/2024 08:33 AM    VLDL 39.4 02/13/2024 08:33 AM    VLDL 75 04/03/2023 12:00 AM     No results found for: \"VITD3\", \"VD3RIA\"    Lab Results   Component Value Date/Time    TSH 2.280 09/20/2023 12:00 AM

## 2024-05-07 NOTE — PROGRESS NOTES
03/12/24 118/68   02/13/24 105/71     Pulse Readings from Last 3 Encounters:   03/25/24 70   03/12/24 59   02/13/24 92     Lab Results   Component Value Date/Time    WBC 5.2 02/13/2024 08:35 AM    HGB 13.0 02/13/2024 08:35 AM    HCT 39.3 02/13/2024 08:35 AM     02/13/2024 08:35 AM    MCV 79.2 02/13/2024 08:35 AM     Lab Results   Component Value Date/Time     02/13/2024 08:33 AM    K 3.9 02/13/2024 08:33 AM     02/13/2024 08:33 AM    CO2 26 02/13/2024 08:33 AM    BUN 21 02/13/2024 08:33 AM    GLOB 3.8 02/13/2024 08:33 AM    ALT 18 02/13/2024 08:33 AM     CrCl cannot be calculated (Unknown ideal weight.).      INR History:   (normal INR range 0.8-1.2)     Date   INR   PT   Dose/Comments  05/14/24 1.3  15.8 3 mg Tue, Fri; 2 mg daily ROW  04/23/24 1.2  14.0 3 mg Fri; 2 mg daily ROW  04/02/24 2.0  24.5 3 mg Fri; 2 mg daily ROW  03/19/24 2.5  30.0 3 mg Tue, Thur; 2 mg daily ROW  02/13/24 1.4  16.5 3 mg Tue; 2 mg daily ROW    Medications that can increase  INR: fluconazole, sertraline,   Medications that can decrease INR: trazodone,     A/P:       Anticoagulation:  Considering Mr. Isaac's past history, todays findings, and per Anticoagulation Collaborative Practice Agreement/Protocol:    Fingerstick POC INR (1.3) is Subtherapeutic for INR goal today.   Change warfarin to 3 mg Tue, Thur, Sat; 2 mg daily ROW  Patient's INR is 1.3 and subtherapeutic. Patient denies any missed doses of warfarin. Patient reports he has not resumed fluconazole due to his provider requesting he make an appointment, however, first available appointment is months away. Patient reports he has not taken sertraline due to waiting on his provider to authorize refills on the prescription. Fluconazole and sertraline can enhance the therapeutic effects of warfarin. Advised patient to contact the nurse to discuss fluconazole and sertraline refills. Patient reports a poor appetite due to not feeling well. Patient reports he is

## 2024-05-07 NOTE — PATIENT INSTRUCTIONS
Today your INR was 1.3 .      Your goal INR is  1.5-2.0 .    You have a   1 mg and 2 mg tablet of Coumadin (warfarin).   Take Coumadin (warfarin) as follows:    Take 3 mg every Tuesday, Thursday, Saturday; and 2 mg all other days of the week.    Come back in 2 week(s) for your next finger stick/INR blood test.        Avoid any over the counter items containing aspirin or ibuprofen, and avoid great swings in general diet.  Avoid alcohol consumption.  Please notify the INR pharmacist if you are started on any new medication including over the counter or herbal supplements. Also, please notify your INR pharmacist if any of your other prescription or over the counter medications have been discontinued.     Call [facility] at [phone] if you have any signs of abnormal bleeding/blood clot.  ------------------------------------------------------------------------------------------------------------------  Taking Warfarin Safely: Care Instructions    Your Care Instructions  Warfarin is a medicine that you take to prevent blood clots. It is often called a blood thinner. Doctors give warfarin (such as Coumadin) to reduce the risk of blood clots. You may be at risk for blood clots if you have atrial fibrillation or deep vein thrombosis. Some other health problems may also put you at risk.  Warfarin slows the amount of time it takes for your blood to clot. It can cause bleeding problems. Even if you've been taking warfarin for a while, it's important to know how to take it safely.  Foods and other medicines can affect the way warfarin works. Some can make warfarin work too well. This can cause bleeding problems. And some can make it work poorly, so that it does not prevent blood clots very well.  You will need regular blood tests to check how long it takes for your blood to form a clot. This test is called a PT or prothrombin time test. The result of the test is called an INR

## 2024-05-13 RX ORDER — FERROUS SULFATE 325(65) MG
1 TABLET ORAL 2 TIMES DAILY
Qty: 60 TABLET | Refills: 2 | Status: SHIPPED | OUTPATIENT
Start: 2024-05-13

## 2024-05-13 NOTE — TELEPHONE ENCOUNTER
PCP: Viktoriya Chow MD    Last appt: [unfilled]  Future Appointments   Date Time Provider Department Center   5/14/2024  9:00 AM [facility]   6/11/2024  9:00 AM Viktoriya Chow MD [facility]   3/25/2025  8:40 AM Jamin Gaffney MD [facility]       Requested Prescriptions     Pending Prescriptions Disp Refills    FEROSUL 325 (65 Fe) MG tablet [Pharmacy Med Name: FERROUS SULFATE 325MG (5GR) TABS] 60 tablet 5     Sig: TAKE 1 TABLET BY MOUTH TWICE DAILY       Prior labs and Blood pressures:  BP Readings from Last 3 Encounters:   03/25/24 118/60   03/12/24 118/68   02/13/24 105/71     Lab Results   Component Value Date/Time     02/13/2024 08:33 AM    K 3.9 02/13/2024 08:33 AM     02/13/2024 08:33 AM    CO2 26 02/13/2024 08:33 AM    BUN 21 02/13/2024 08:33 AM     No results found for: \"HBA1C\", \"[id number]\"  Lab Results   Component Value Date/Time    CHOL 174 02/13/2024 08:33 AM    HDL 27 02/13/2024 08:33 AM    .6 02/13/2024 08:33 AM    VLDL 39.4 02/13/2024 08:33 AM    VLDL 75 04/03/2023 12:00 AM     No results found for: \"VITD3\", \"VD3RIA\"    Lab Results   Component Value Date/Time    TSH 2.280 09/20/2023 12:00 AM

## 2024-05-14 ENCOUNTER — ANTI-COAG VISIT (OUTPATIENT)
Facility: HOSPITAL | Age: 29
End: 2024-05-14
Payer: MEDICAID

## 2024-05-14 DIAGNOSIS — Z95.2 HISTORY OF MECHANICAL AORTIC VALVE REPLACEMENT: Primary | ICD-10-CM

## 2024-05-14 DIAGNOSIS — Z79.01 CURRENT USE OF LONG TERM ANTICOAGULATION: ICD-10-CM

## 2024-05-14 LAB — INTERNATIONAL NORMALIZATION RATIO, POC: 1.3 (ref 1.5–2)

## 2024-05-14 PROCEDURE — 85610 PROTHROMBIN TIME: CPT

## 2024-05-14 PROCEDURE — 99212 OFFICE O/P EST SF 10 MIN: CPT

## 2024-05-15 DIAGNOSIS — F33.9 EPISODE OF RECURRENT MAJOR DEPRESSIVE DISORDER, UNSPECIFIED DEPRESSION EPISODE SEVERITY (HCC): Primary | ICD-10-CM

## 2024-05-15 RX ORDER — SERTRALINE HYDROCHLORIDE 100 MG/1
100 TABLET, FILM COATED ORAL DAILY
Qty: 90 TABLET | Refills: 0 | Status: SHIPPED | OUTPATIENT
Start: 2024-05-15

## 2024-05-15 NOTE — TELEPHONE ENCOUNTER
PCP: Viktoriya Chow MD    Last appt: [unfilled]  Future Appointments   Date Time Provider Department Center   5/28/2024  9:00 AM [facility]   6/11/2024  9:00 AM Viktoriya Chow MD [facility]   3/25/2025  8:40 AM Jamin Gaffney MD [facility] AM[facility]       Requested Prescriptions     Pending Prescriptions Disp Refills    sertraline (ZOLOFT) 100 MG tablet 30 tablet      Sig: Take 1 tablet by mouth daily       Prior labs and Blood pressures:  BP Readings from Last 3 Encounters:   03/25/24 118/60   03/12/24 118/68   02/13/24 105/71     Lab Results   Component Value Date/Time     02/13/2024 08:33 AM    K 3.9 02/13/2024 08:33 AM     02/13/2024 08:33 AM    CO2 26 02/13/2024 08:33 AM    BUN 21 02/13/2024 08:33 AM     No results found for: \"HBA1C\", \"[id number]\"  Lab Results   Component Value Date/Time    CHOL 174 02/13/2024 08:33 AM    HDL 27 02/13/2024 08:33 AM    .6 02/13/2024 08:33 AM    VLDL 39.4 02/13/2024 08:33 AM    VLDL 75 04/03/2023 12:00 AM     No results found for: \"VITD3\"    Lab Results   Component Value Date/Time    TSH 2.280 09/20/2023 12:00 AM

## 2024-05-21 ENCOUNTER — TELEPHONE (OUTPATIENT)
Age: 29
End: 2024-05-21

## 2024-05-21 DIAGNOSIS — Z79.01 CURRENT USE OF LONG TERM ANTICOAGULATION: Primary | ICD-10-CM

## 2024-05-21 NOTE — TELEPHONE ENCOUNTER
[facility] is calling because they need a new referral for the patient because he was a previous patient of .Patient needs a referral so that they can follow his INR readings.      [phone] Chip

## 2024-05-21 NOTE — TELEPHONE ENCOUNTER
Referral placed for Medication Management at [facility] for INR checks.  Future Appointments   Date Time Provider Department Center   5/28/2024  9:00 AM [facility] MEDICATION MGMT [facility]   6/11/2024  9:00 AM Viktoriya Chow MD [facility]   3/25/2025  8:40 AM Jamin Gaffney MD [facility]

## 2024-05-27 NOTE — PATIENT INSTRUCTIONS
Today your INR was 1.3 .      Your goal INR is  1.5-2.0 .    You have a   1 mg and 2 mg tablet of Coumadin (warfarin).   Take Coumadin (warfarin) as follows:    Take 2 mg every Monday, Friday; and 3 mg all other days of the week.    Come back in 3 week(s) for your next finger stick/INR blood test.        Avoid any over the counter items containing aspirin or ibuprofen, and avoid great swings in general diet.  Avoid alcohol consumption.  Please notify the INR pharmacist if you are started on any new medication including over the counter or herbal supplements. Also, please notify your INR pharmacist if any of your other prescription or over the counter medications have been discontinued.     Call [facility] at [phone] if you have any signs of abnormal bleeding/blood clot.  ------------------------------------------------------------------------------------------------------------------  Taking Warfarin Safely: Care Instructions    Your Care Instructions  Warfarin is a medicine that you take to prevent blood clots. It is often called a blood thinner. Doctors give warfarin (such as Coumadin) to reduce the risk of blood clots. You may be at risk for blood clots if you have atrial fibrillation or deep vein thrombosis. Some other health problems may also put you at risk.  Warfarin slows the amount of time it takes for your blood to clot. It can cause bleeding problems. Even if you've been taking warfarin for a while, it's important to know how to take it safely.  Foods and other medicines can affect the way warfarin works. Some can make warfarin work too well. This can cause bleeding problems. And some can make it work poorly, so that it does not prevent blood clots very well.  You will need regular blood tests to check how long it takes for your blood to form a clot. This test is called a PT or prothrombin time test. The result of the test is called an INR level. Depending

## 2024-05-28 ENCOUNTER — ANTI-COAG VISIT (OUTPATIENT)
Facility: HOSPITAL | Age: 29
End: 2024-05-28
Payer: MEDICAID

## 2024-05-28 DIAGNOSIS — Z79.01 CURRENT USE OF LONG TERM ANTICOAGULATION: ICD-10-CM

## 2024-05-28 DIAGNOSIS — Z95.2 HISTORY OF MECHANICAL AORTIC VALVE REPLACEMENT: Primary | ICD-10-CM

## 2024-05-28 LAB — INTERNATIONAL NORMALIZATION RATIO, POC: 1.3 (ref 1.5–2)

## 2024-05-28 PROCEDURE — 99213 OFFICE O/P EST LOW 20 MIN: CPT

## 2024-05-28 PROCEDURE — 85610 PROTHROMBIN TIME: CPT

## 2024-05-28 RX ORDER — WARFARIN SODIUM 1 MG/1
TABLET ORAL
Qty: 75 TABLET | Refills: 1 | Status: SHIPPED | OUTPATIENT
Start: 2024-05-28

## 2024-06-10 NOTE — TELEPHONE ENCOUNTER
PCP: Viktoriya Chow MD    Last appt: [unfilled]  Future Appointments   Date Time Provider Department Center   6/11/2024  9:00 AM Viktoriya Chow MD [facility]   6/18/2024  9:00 AM [facility] MEDICATION [facility]   3/25/2025  8:40 AM Jamin Gaffney MD [facility]       Requested Prescriptions     Pending Prescriptions Disp Refills    carvedilol (COREG) 12.5 MG tablet [Pharmacy Med Name: CARVEDILOL 12.5MG TABLETS] 180 tablet 1     Sig: TAKE 1 TABLET BY MOUTH TWICE DAILY       Prior labs and Blood pressures:  BP Readings from Last 3 Encounters:   03/25/24 118/60   03/12/24 118/68   02/13/24 105/71     Lab Results   Component Value Date/Time     02/13/2024 08:33 AM    K 3.9 02/13/2024 08:33 AM     02/13/2024 08:33 AM    CO2 26 02/13/2024 08:33 AM    BUN 21 02/13/2024 08:33 AM     No results found for: \"HBA1C\", \"[id number]\"  Lab Results   Component Value Date/Time    CHOL 174 02/13/2024 08:33 AM    HDL 27 02/13/2024 08:33 AM    .6 02/13/2024 08:33 AM    VLDL 39.4 02/13/2024 08:33 AM    VLDL 75 04/03/2023 12:00 AM     No results found for: \"VITD3\"    Lab Results   Component Value Date/Time    TSH 2.280 09/20/2023 12:00 AM

## 2024-06-11 RX ORDER — CARVEDILOL 12.5 MG/1
TABLET ORAL
Qty: 180 TABLET | Refills: 0 | Status: SHIPPED | OUTPATIENT
Start: 2024-06-11

## 2024-06-18 ENCOUNTER — TELEPHONE (OUTPATIENT)
Facility: HOSPITAL | Age: 29
End: 2024-06-18

## 2024-06-18 NOTE — TELEPHONE ENCOUNTER
[facility] - Cancel Appointment    Returned patient's call. Patient requested to cancel appointment today (6/18/24) due to transportation. Patient reports his father's car is not working and he is unsure when it will be fixed. Informed patient, the [facility] will contact him in 2 weeks to check on the status of the car.    Thank you.  Chip Noriega, CristalD

## 2024-07-03 ENCOUNTER — OFFICE VISIT (OUTPATIENT)
Facility: CLINIC | Age: 29
End: 2024-07-03
Payer: MEDICAID

## 2024-07-03 VITALS
RESPIRATION RATE: 20 BRPM | TEMPERATURE: 97.3 F | WEIGHT: 270.8 LBS | SYSTOLIC BLOOD PRESSURE: 96 MMHG | OXYGEN SATURATION: 96 % | BODY MASS INDEX: 33.67 KG/M2 | DIASTOLIC BLOOD PRESSURE: 63 MMHG | HEART RATE: 83 BPM | HEIGHT: 75 IN

## 2024-07-03 DIAGNOSIS — R56.9 SEIZURES (HCC): ICD-10-CM

## 2024-07-03 DIAGNOSIS — F32.9 MAJOR DEPRESSIVE DISORDER WITH CURRENT ACTIVE EPISODE, UNSPECIFIED DEPRESSION EPISODE SEVERITY, UNSPECIFIED WHETHER RECURRENT: ICD-10-CM

## 2024-07-03 DIAGNOSIS — D69.6 THROMBOCYTOPENIA (HCC): ICD-10-CM

## 2024-07-03 DIAGNOSIS — K21.9 GASTROESOPHAGEAL REFLUX DISEASE, UNSPECIFIED WHETHER ESOPHAGITIS PRESENT: ICD-10-CM

## 2024-07-03 DIAGNOSIS — B37.6: ICD-10-CM

## 2024-07-03 DIAGNOSIS — K21.9 GASTROESOPHAGEAL REFLUX DISEASE WITHOUT ESOPHAGITIS: ICD-10-CM

## 2024-07-03 DIAGNOSIS — M25.552 LEFT HIP PAIN: ICD-10-CM

## 2024-07-03 DIAGNOSIS — D50.9 IRON DEFICIENCY ANEMIA, UNSPECIFIED IRON DEFICIENCY ANEMIA TYPE: Primary | ICD-10-CM

## 2024-07-03 LAB
ANION GAP SERPL CALC-SCNC: 8 MMOL/L (ref 5–15)
BUN SERPL-MCNC: 26 MG/DL (ref 6–20)
BUN/CREAT SERPL: 24 (ref 12–20)
CALCIUM SERPL-MCNC: 9.1 MG/DL (ref 8.5–10.1)
CHLORIDE SERPL-SCNC: 105 MMOL/L (ref 97–108)
CO2 SERPL-SCNC: 25 MMOL/L (ref 21–32)
CREAT SERPL-MCNC: 1.09 MG/DL (ref 0.7–1.3)
ERYTHROCYTE [DISTWIDTH] IN BLOOD BY AUTOMATED COUNT: 13.4 % (ref 11.5–14.5)
GLUCOSE SERPL-MCNC: 95 MG/DL (ref 65–100)
HCT VFR BLD AUTO: 37.5 % (ref 36.6–50.3)
HGB BLD-MCNC: 12.3 G/DL (ref 12.1–17)
IRON SATN MFR SERPL: 30 % (ref 20–50)
IRON SERPL-MCNC: 61 UG/DL (ref 35–150)
MCH RBC QN AUTO: 25.4 PG (ref 26–34)
MCHC RBC AUTO-ENTMCNC: 32.8 G/DL (ref 30–36.5)
MCV RBC AUTO: 77.3 FL (ref 80–99)
NRBC # BLD: 0 K/UL (ref 0–0.01)
NRBC BLD-RTO: 0 PER 100 WBC
PLATELET # BLD AUTO: 278 K/UL (ref 150–400)
PMV BLD AUTO: 8.6 FL (ref 8.9–12.9)
POTASSIUM SERPL-SCNC: 4.2 MMOL/L (ref 3.5–5.1)
RBC # BLD AUTO: 4.85 M/UL (ref 4.1–5.7)
SODIUM SERPL-SCNC: 138 MMOL/L (ref 136–145)
TIBC SERPL-MCNC: 201 UG/DL (ref 250–450)
TSH SERPL DL<=0.05 MIU/L-ACNC: 4.9 UIU/ML (ref 0.36–3.74)
WBC # BLD AUTO: 6 K/UL (ref 4.1–11.1)

## 2024-07-03 PROCEDURE — 99214 OFFICE O/P EST MOD 30 MIN: CPT | Performed by: STUDENT IN AN ORGANIZED HEALTH CARE EDUCATION/TRAINING PROGRAM

## 2024-07-03 RX ORDER — PANTOPRAZOLE SODIUM 40 MG/1
40 TABLET, DELAYED RELEASE ORAL DAILY
Qty: 90 TABLET | Refills: 1 | Status: SHIPPED | OUTPATIENT
Start: 2024-07-03

## 2024-07-03 RX ORDER — FERROUS SULFATE 325(65) MG
1 TABLET ORAL 2 TIMES DAILY
Qty: 180 TABLET | Refills: 1 | Status: SHIPPED | OUTPATIENT
Start: 2024-07-03

## 2024-07-03 SDOH — ECONOMIC STABILITY: FOOD INSECURITY: WITHIN THE PAST 12 MONTHS, YOU WORRIED THAT YOUR FOOD WOULD RUN OUT BEFORE YOU GOT MONEY TO BUY MORE.: NEVER TRUE

## 2024-07-03 SDOH — ECONOMIC STABILITY: FOOD INSECURITY: WITHIN THE PAST 12 MONTHS, THE FOOD YOU BOUGHT JUST DIDN'T LAST AND YOU DIDN'T HAVE MONEY TO GET MORE.: NEVER TRUE

## 2024-07-03 SDOH — ECONOMIC STABILITY: INCOME INSECURITY: HOW HARD IS IT FOR YOU TO PAY FOR THE VERY BASICS LIKE FOOD, HOUSING, MEDICAL CARE, AND HEATING?: NOT HARD AT ALL

## 2024-07-03 ASSESSMENT — ENCOUNTER SYMPTOMS
COUGH: 0
ABDOMINAL PAIN: 0
SHORTNESS OF BREATH: 0
RHINORRHEA: 0
VOMITING: 0
NAUSEA: 0

## 2024-07-03 NOTE — PROGRESS NOTES
dentified pt with two pt identifiers(name and ).    Chief Complaint   Patient presents with    Follow-up     Patient here for a 3 months follow up.        Health Maintenance Due   Topic    Hepatitis B vaccine (1 of 3 - 3-dose series)    Varicella vaccine (1 of 2 - 2-dose childhood series)    HIV screen     Hepatitis C screen     DTaP/Tdap/Td vaccine (5 - Td or Tdap)    COVID-19 Vaccine ( season)       Wt Readings from Last 3 Encounters:   24 122.8 kg (270 lb 12.8 oz)   24 124.7 kg (275 lb)   24 129.7 kg (286 lb)     Temp Readings from Last 3 Encounters:   24 97.3 °F (36.3 °C) (Temporal)   24 97.7 °F (36.5 °C) (Temporal)   24 97.7 °F (36.5 °C) (Skin)     BP Readings from Last 3 Encounters:   24 96/63   24 118/60   24 118/68     Pulse Readings from Last 3 Encounters:   24 83   24 70   24 59           Coordination of Care Questionnaire:  :   1. \"Have you been to the ER, urgent care clinic since your last visit?  Hospitalized since your last visit?\" no    2. \"Have you seen or consulted any other health care providers outside of the [facility] since your last visit?\" no     3. For patients aged 45-75: Has the patient had a colonoscopy / FIT/ Cologuard? no      If the patient is female:    4. For patients aged 40-74: Has the patient had a mammogram within the past 2 years? no      5. For patients aged 21-65: Has the patient had a pap smear? no     3) Do you have an Advance Directive on file? no  Are you interested in receiving information about Advance Directives? no    Patient is accompanied by self I have received verbal consent from Addison Isaac to discuss any/all medical information while they are present in the room.

## 2024-07-03 NOTE — PROGRESS NOTES
Assessment/Plan:     Diagnoses and all orders for this visit:    Iron deficiency anemia, unspecified iron deficiency anemia type  -     CBC; Future  -     Iron and TIBC; Future  -     ferrous sulfate (FEROSUL) 325 (65 Fe) MG tablet; Take 1 tablet by mouth 2 times daily  -Chronic.  Presumed stable.  -Repeat lab work today  -Continue oral iron    Major depressive disorder with current active episode, unspecified depression episode severity, unspecified whether recurrent  -     TSH; Future  -Chronic.  Stable.  Denies any SI/HI  -Patient does not desire to make any medication dosage adjustment  -Therefore continue current dosage of Zoloft and trazodone    Seizures ([facility])  -     Basic Metabolic Panel; Future  -Chronic.  Stable.  -Patient denies any recent seizure activity  -Is followed and managed by neurology    Thrombocytopenia ([facility])  -     CBC; Future  -Previous history of thrombocytopenia  -Repeat CBC    Gastroesophageal reflux disease without esophagitis  -     pantoprazole (PROTONIX) 40 MG tablet; Take 1 tablet by mouth daily  -Chronic.  Stable.  -Symptoms do return if he comes off of the pantoprazole  -Patient states he has had EGDs completed previously  -Continue pantoprazole daily    Candidal endocarditis, unspecified chronicity  -Patient has a previous history of candidal endocarditis  -Is followed by cardiology as well as infectious disease  -Advised patient to ensure scheduling an appointment with his infectious disease provider.  If needing a new provider to let me know and I can place new referral    Left hip pain  -Left hip pain that is improving  -Unremarkable physical exam findings  -Recommend Tylenol as needed  -Return precautions discussed  -If needed consider physical therapy         Return in about 6 months (around 1/3/2025), or if symptoms worsen or fail to improve.     Discussed expected course/resolution/complications of diagnosis in detail with patient.    Medication

## 2024-07-10 DIAGNOSIS — F33.9 EPISODE OF RECURRENT MAJOR DEPRESSIVE DISORDER, UNSPECIFIED DEPRESSION EPISODE SEVERITY (HCC): ICD-10-CM

## 2024-07-10 RX ORDER — SERTRALINE HYDROCHLORIDE 100 MG/1
100 TABLET, FILM COATED ORAL DAILY
Qty: 90 TABLET | Refills: 1 | Status: SHIPPED | OUTPATIENT
Start: 2024-07-10

## 2024-08-02 ENCOUNTER — TELEPHONE (OUTPATIENT)
Facility: HOSPITAL | Age: 29
End: 2024-08-02

## 2024-08-02 NOTE — TELEPHONE ENCOUNTER
[facility]     Called and left message for patient to reschedule cancelled INR check appointment. Patient's last INR check was 1.3 and subtherapeutic for an INR goal of 1.5-2.0 on 5/28/24.    Thank you.  Chip Noriega, PharmD

## 2024-08-19 DIAGNOSIS — F33.9 EPISODE OF RECURRENT MAJOR DEPRESSIVE DISORDER, UNSPECIFIED DEPRESSION EPISODE SEVERITY (HCC): ICD-10-CM

## 2024-08-20 RX ORDER — SERTRALINE HYDROCHLORIDE 100 MG/1
100 TABLET, FILM COATED ORAL DAILY
Qty: 90 TABLET | Refills: 1 | OUTPATIENT
Start: 2024-08-20

## 2024-08-21 ENCOUNTER — OFFICE VISIT (OUTPATIENT)
Facility: CLINIC | Age: 29
End: 2024-08-21
Payer: MEDICAID

## 2024-08-21 ENCOUNTER — TELEPHONE (OUTPATIENT)
Facility: HOSPITAL | Age: 29
End: 2024-08-21

## 2024-08-21 VITALS
HEIGHT: 75 IN | SYSTOLIC BLOOD PRESSURE: 127 MMHG | TEMPERATURE: 97.1 F | HEART RATE: 67 BPM | BODY MASS INDEX: 34.94 KG/M2 | OXYGEN SATURATION: 98 % | DIASTOLIC BLOOD PRESSURE: 75 MMHG | WEIGHT: 281 LBS

## 2024-08-21 DIAGNOSIS — Z11.59 ENCOUNTER FOR HEPATITIS C SCREENING TEST FOR LOW RISK PATIENT: ICD-10-CM

## 2024-08-21 DIAGNOSIS — R79.89 ELEVATED TSH: ICD-10-CM

## 2024-08-21 DIAGNOSIS — Z00.00 ANNUAL PHYSICAL EXAM: Primary | ICD-10-CM

## 2024-08-21 DIAGNOSIS — F33.9 EPISODE OF RECURRENT MAJOR DEPRESSIVE DISORDER, UNSPECIFIED DEPRESSION EPISODE SEVERITY (HCC): ICD-10-CM

## 2024-08-21 DIAGNOSIS — Z23 IMMUNIZATION DUE: ICD-10-CM

## 2024-08-21 DIAGNOSIS — Z11.4 ENCOUNTER FOR SCREENING FOR HIV: ICD-10-CM

## 2024-08-21 PROCEDURE — 99214 OFFICE O/P EST MOD 30 MIN: CPT | Performed by: STUDENT IN AN ORGANIZED HEALTH CARE EDUCATION/TRAINING PROGRAM

## 2024-08-21 PROCEDURE — 99395 PREV VISIT EST AGE 18-39: CPT | Performed by: STUDENT IN AN ORGANIZED HEALTH CARE EDUCATION/TRAINING PROGRAM

## 2024-08-21 RX ORDER — SERTRALINE HYDROCHLORIDE 100 MG/1
100 TABLET, FILM COATED ORAL DAILY
Qty: 90 TABLET | Refills: 1 | Status: SHIPPED | OUTPATIENT
Start: 2024-08-21

## 2024-08-21 ASSESSMENT — PATIENT HEALTH QUESTIONNAIRE - PHQ9
SUM OF ALL RESPONSES TO PHQ QUESTIONS 1-9: 0
2. FEELING DOWN, DEPRESSED OR HOPELESS: NOT AT ALL
SUM OF ALL RESPONSES TO PHQ9 QUESTIONS 1 & 2: 0
1. LITTLE INTEREST OR PLEASURE IN DOING THINGS: NOT AT ALL

## 2024-08-21 ASSESSMENT — ENCOUNTER SYMPTOMS
NAUSEA: 0
RHINORRHEA: 0
COUGH: 0
ABDOMINAL PAIN: 0
VOMITING: 0
SHORTNESS OF BREATH: 0

## 2024-08-21 NOTE — PROGRESS NOTES
Identified pt with two pt identifiers(name and ). Reviewed record in preparation for visit and have obtained necessary documentation. All patient medications has been reviewed.  Chief Complaint   Patient presents with    Abnormal Test Results    Annual Exam       Vitals:    24 0921   BP: 127/75   Pulse: 67   Temp: 97.1 °F (36.2 °C)   SpO2: 98%                   Coordination of Care Questionnaire:   1) Have you been to an emergency room, urgent care, or hospitalized since your last visit?   no      2. Have seen or consulted any other health care provider since your last visit? no

## 2024-08-21 NOTE — PROGRESS NOTES
Addison Isaac  29 y.o. male  1995  MRN:[id number]  [facility]  Progress Note     Encounter Date: 8/21/2024      Assessment and Plan:    1. Annual physical exam  -Advised to schedule routine dental and eye exams  -Immunization recommendations reviewed  -Recent lab work reviewed with patient  -Continue routine follow-up with specialists    2. Elevated TSH  - TSH; Future  - T3; Future  - T4, Free; Future  -Previous TSH was mildly elevated  -Repeat TSH along with free T3 and free T4 for further evaluation    3. Episode of recurrent major depressive disorder, unspecified depression episode severity (HCC)  - sertraline (ZOLOFT) 100 MG tablet; Take 1 tablet by mouth daily  Dispense: 90 tablet; Refill: 1  -Chronic.  Stable.  -Continue Zoloft 100 mg daily    4. Encounter for hepatitis C screening test for low risk patient  - Hepatitis C Antibody; Future    5. Encounter for screening for HIV  - HIV 1/2 Ag/Ab, 4TH Generation,W Rflx Confirm; Future    6. Immunization due  - Tetanus-Diphth-Acell Pertussis (BOOSTRIX) 5-2.5-18.5 LF-MCG/0.5 injection; Inject 0.5 mLs into the muscle once for 1 dose  Dispense: 1 each; Refill: 0       Diet, exercise and safety discussed with patient.  Diagnoses and plans were discussed with the patient.    After visit summary given to the patient as well.    Return in about 6 months (around 2/21/2025), or if symptoms worsen or fail to improve.     Viktoriya Chow MD        Addison Isaac is a 29 y.o. male who had concerns including Abnormal Test Results and Annual Exam.  Patient has a history of depression that has been stable on sertraline daily.  He is in need of refill.  Contacted the pharmacy and they stated they did not receive the recent refill.  He also states he has been getting refills of his medications Diflucan from infectious disease.  Advised to schedule follow-up.  Also advised patient to be sure he follows regularly at his Coumadin clinic.  Recent lab

## 2024-08-21 NOTE — TELEPHONE ENCOUNTER
[facility] - Cancel Appointment    Returned patient's call. Patient requested to reschedule his cancelled INR check appointment. Patient's appointment has been rescheduled for next Tuesday, August 27th at 9:00 am.    Thank you.  Chip Noriega, PharmD

## 2024-08-22 LAB
HCV AB SER IA-ACNC: 0.37 INDEX
HCV AB SERPL QL IA: NONREACTIVE
HIV 1+2 AB+HIV1 P24 AG SERPL QL IA: NONREACTIVE
HIV 1/2 RESULT COMMENT: NORMAL
T4 FREE SERPL-MCNC: 1.2 NG/DL (ref 0.8–1.5)
TSH SERPL DL<=0.05 MIU/L-ACNC: 3.97 UIU/ML (ref 0.36–3.74)

## 2024-08-23 LAB — T3 SERPL-MCNC: 166 NG/DL (ref 71–180)

## 2024-08-26 NOTE — PATIENT INSTRUCTIONS
Today your INR was 4.7 .      Your goal INR is  1.5-2.0 .    You have a   1 mg and 2 mg tablet of Coumadin (warfarin).   Take Coumadin (warfarin) as follows:    Hold warfarin doses today (8/27/24) and tomorrow (8/28/24). Starting Thursday (8/29/24) take warfarin 3 mg every Thursday; and 2 mg all other days of the week.    Come back in 2 week(s) for your next finger stick/INR blood test.        Avoid any over the counter items containing aspirin or ibuprofen, and avoid great swings in general diet.  Avoid alcohol consumption.  Please notify the INR pharmacist if you are started on any new medication including over the counter or herbal supplements. Also, please notify your INR pharmacist if any of your other prescription or over the counter medications have been discontinued.     Call [facility] at [phone] if you have any signs of abnormal bleeding/blood clot.  ------------------------------------------------------------------------------------------------------------------  Taking Warfarin Safely: Care Instructions    Your Care Instructions  Warfarin is a medicine that you take to prevent blood clots. It is often called a blood thinner. Doctors give warfarin (such as Coumadin) to reduce the risk of blood clots. You may be at risk for blood clots if you have atrial fibrillation or deep vein thrombosis. Some other health problems may also put you at risk.  Warfarin slows the amount of time it takes for your blood to clot. It can cause bleeding problems. Even if you've been taking warfarin for a while, it's important to know how to take it safely.  Foods and other medicines can affect the way warfarin works. Some can make warfarin work too well. This can cause bleeding problems. And some can make it work poorly, so that it does not prevent blood clots very well.  You will need regular blood tests to check how long it takes for your blood to form a clot. This test is

## 2024-08-26 NOTE — PROGRESS NOTES
clinic.    Medication reconciliation was completed during the visit.    There are no discontinued medications.      A full discussion of the nature of anticoagulants has been carried out.  A full discussion of the need for frequent and regular monitoring, precise dosage adjustment and compliance was stressed.  Side effects of potential bleeding were discussed and Mr. Isaac was instructed to call [phone] if there are any signs of abnormal bleeding.  Mr. Isaac was instructed to avoid any OTC items containing aspirin or ibuprofen and prior to starting any new OTC products to consult with his physician or pharmacist to ensure no drug interactions are present.  Mr. Isaac was instructed to avoid any major changes in his general diet and to avoid alcohol consumption.    Mr. Isaac was provided information in the AVS that includes topics on understanding coumadin therapy, drug interaction considerations, vitamin K and coumadin use, interactions with foods and supplements containing vitamin K, and the use of herbal products.    Mr. Isaac verbalized his understanding of all instructions and will call the office with any questions, concerns, or signs of abnormal bleeding or blood clot.  Notifications of recommendations will be sent to Dr. Jamin Gaffney MD for review.    Thank you,  Chip Noriega, PharmD      For Pharmacy Admin Tracking Only    Intervention Detail: Adherence Monitorin, Dose Adjustment: 1, reason: Therapy Optimization, Lab(s) Ordered, and Refill(s) Provided  Total # of Interventions Recommended: 4  Total # of Interventions Accepted: 4  Time Spent (min): 30

## 2024-08-27 ENCOUNTER — ANTI-COAG VISIT (OUTPATIENT)
Facility: HOSPITAL | Age: 29
End: 2024-08-27
Payer: MEDICAID

## 2024-08-27 DIAGNOSIS — Z95.2 HISTORY OF MECHANICAL AORTIC VALVE REPLACEMENT: Primary | ICD-10-CM

## 2024-08-27 DIAGNOSIS — Z79.01 CURRENT USE OF LONG TERM ANTICOAGULATION: ICD-10-CM

## 2024-08-27 LAB
INTERNATIONAL NORMALIZATION RATIO, POC: 4.7 (ref 2–3)
PROTHROMBIN TIME, POC: 56.3

## 2024-08-27 PROCEDURE — 99213 OFFICE O/P EST LOW 20 MIN: CPT

## 2024-08-27 PROCEDURE — 85610 PROTHROMBIN TIME: CPT

## 2024-08-27 RX ORDER — WARFARIN SODIUM 1 MG/1
TABLET ORAL
Qty: 75 TABLET | Refills: 0 | Status: SHIPPED | OUTPATIENT
Start: 2024-08-27

## 2024-08-27 RX ORDER — WARFARIN SODIUM 1 MG/1
TABLET ORAL
Qty: 75 TABLET | Refills: 0
Start: 2024-08-27 | End: 2024-08-27 | Stop reason: ALTCHOICE

## 2024-08-28 ENCOUNTER — TELEPHONE (OUTPATIENT)
Age: 29
End: 2024-08-28

## 2024-08-28 NOTE — TELEPHONE ENCOUNTER
Chip Noriega, [facility]  Marybel Nielsen, PAYAL  Good evening Marybel,    Patient is currently followed by the [facility] for anticoagulation services. Patient requested today during his INR check visit to be set up with home INR monitoring. Can you please assist patient?    Thank you!  Have a wonderful evening!  Chip

## 2024-09-10 ENCOUNTER — TELEPHONE (OUTPATIENT)
Facility: HOSPITAL | Age: 29
End: 2024-09-10

## 2024-09-24 ENCOUNTER — ANTI-COAG VISIT (OUTPATIENT)
Facility: HOSPITAL | Age: 29
End: 2024-09-24
Payer: MEDICAID

## 2024-09-24 DIAGNOSIS — Z79.01 CURRENT USE OF LONG TERM ANTICOAGULATION: ICD-10-CM

## 2024-09-24 DIAGNOSIS — Z95.2 HISTORY OF MECHANICAL AORTIC VALVE REPLACEMENT: Primary | ICD-10-CM

## 2024-09-24 LAB
INTERNATIONAL NORMALIZATION RATIO, POC: 1.3 (ref 1.5–2)
PROTHROMBIN TIME, POC: 15

## 2024-09-24 PROCEDURE — 99213 OFFICE O/P EST LOW 20 MIN: CPT

## 2024-09-24 PROCEDURE — 85610 PROTHROMBIN TIME: CPT

## 2024-09-24 RX ORDER — WARFARIN SODIUM 2 MG/1
TABLET ORAL
Qty: 90 TABLET | Refills: 0 | Status: SHIPPED | OUTPATIENT
Start: 2024-09-24

## 2024-10-02 RX ORDER — CARVEDILOL 12.5 MG/1
TABLET ORAL
Qty: 180 TABLET | Refills: 1 | Status: SHIPPED | OUTPATIENT
Start: 2024-10-02

## 2024-10-15 ENCOUNTER — ANTI-COAG VISIT (OUTPATIENT)
Facility: HOSPITAL | Age: 29
End: 2024-10-15
Payer: MEDICAID

## 2024-10-15 DIAGNOSIS — Z95.2 HISTORY OF MECHANICAL AORTIC VALVE REPLACEMENT: Primary | ICD-10-CM

## 2024-10-15 LAB
INTERNATIONAL NORMALIZATION RATIO, POC: 4.1 (ref 1.5–2)
PROTHROMBIN TIME, POC: 49.7

## 2024-10-15 PROCEDURE — 99212 OFFICE O/P EST SF 10 MIN: CPT

## 2024-10-15 PROCEDURE — 85610 PROTHROMBIN TIME: CPT

## 2024-10-15 NOTE — PATIENT INSTRUCTIONS
Today your INR was 4.1 .      Your goal INR is  1.5-2.0 .    You have a   1 mg and 2 mg tablet of Coumadin (warfarin).   Take Coumadin (warfarin) as follows:    Hold warfarin doses today (10/15/24) and tomorrow (10/16/24). Starting Thursday (10/17/24) take warfarin 2 mg every day.    Come back in 3 week(s) for your next finger stick/INR blood test.        Avoid any over the counter items containing aspirin or ibuprofen, and avoid great swings in general diet.  Avoid alcohol consumption.  Please notify the INR pharmacist if you are started on any new medication including over the counter or herbal supplements. Also, please notify your INR pharmacist if any of your other prescription or over the counter medications have been discontinued.     Call [facility] at [phone] if you have any signs of abnormal bleeding/blood clot.  ------------------------------------------------------------------------------------------------------------------  Taking Warfarin Safely: Care Instructions    Your Care Instructions  Warfarin is a medicine that you take to prevent blood clots. It is often called a blood thinner. Doctors give warfarin (such as Coumadin) to reduce the risk of blood clots. You may be at risk for blood clots if you have atrial fibrillation or deep vein thrombosis. Some other health problems may also put you at risk.  Warfarin slows the amount of time it takes for your blood to clot. It can cause bleeding problems. Even if you've been taking warfarin for a while, it's important to know how to take it safely.  Foods and other medicines can affect the way warfarin works. Some can make warfarin work too well. This can cause bleeding problems. And some can make it work poorly, so that it does not prevent blood clots very well.  You will need regular blood tests to check how long it takes for your blood to form a clot. This test is called a PT or prothrombin time test. The

## 2024-10-31 DIAGNOSIS — G47.09 OTHER INSOMNIA: ICD-10-CM

## 2024-10-31 RX ORDER — TRAZODONE HYDROCHLORIDE 50 MG/1
50 TABLET, FILM COATED ORAL NIGHTLY
Qty: 90 TABLET | Refills: 1 | Status: SHIPPED | OUTPATIENT
Start: 2024-10-31

## 2024-11-05 ENCOUNTER — ANTI-COAG VISIT (OUTPATIENT)
Facility: HOSPITAL | Age: 29
End: 2024-11-05
Payer: MEDICAID

## 2024-11-05 DIAGNOSIS — Z95.2 HISTORY OF MECHANICAL AORTIC VALVE REPLACEMENT: Primary | ICD-10-CM

## 2024-11-05 DIAGNOSIS — Z79.01 CURRENT USE OF LONG TERM ANTICOAGULATION: ICD-10-CM

## 2024-11-05 LAB
INTERNATIONAL NORMALIZATION RATIO, POC: 1.4 (ref 1.5–2)
PROTHROMBIN TIME, POC: 16.4

## 2024-11-05 PROCEDURE — 85610 PROTHROMBIN TIME: CPT

## 2024-11-05 PROCEDURE — 99211 OFF/OP EST MAY X REQ PHY/QHP: CPT

## 2024-11-05 NOTE — PATIENT INSTRUCTIONS
supplement drinks.  Avoid cranberry juice and other cranberry products. They can increase the effects of warfarin.  Limit your use of alcohol.    Avoid bleeding by preventing falls and injuries  Wear slippers or shoes with nonskid soles.  Remove throw rugs and clutter.  Rearrange furniture and electrical cords to keep them out of walking paths.  Keep stairways, porches, and outside walkways well lit. Use night-lights in hallways and bathrooms.  Be extra careful when you work with sharp tools or knives.    When should you call for help?  Call 911 anytime you think you may need emergency care. For example, call if:  You have a sudden, severe headache that is different from past headaches.  Call your doctor now or seek immediate medical care if:  You have any abnormal bleeding, such as:  Nosebleeds.  Vaginal bleeding that is different (heavier, more frequent, at a different time of the month) than what you are used to.  Bloody or black stools, or rectal bleeding.  Bloody or pink urine.  Watch closely for changes in your health, and be sure to contact your doctor if you have any problems.    Where can you learn more?  Go to http://[email or web address] You.net/HomeConpConnections.  Enter N655 in the search box to learn more about \"Taking Warfarin Safely: Care Instructions.\"  Current as of: January 27, 2016  Content Version: 11.1  © 2567-6531 [employer]. Care instructions adapted under license by [employer] (which disclaims liability or warranty for this information). If you have questions about a medical condition or this instruction, always ask your healthcare professional. [employer] disclaims any warranty or liability for your use of this information.

## 2024-11-05 NOTE — PROGRESS NOTES
Pharmacy Progress Note - Anticoagulation Management    S/O:  Mr. Addison Isaac  is a 29 y.o. male seen today for anticoagulation management for the diagnosis of Aortic Valve Replacement.     HPI: At last visit (10/15), the patient's INR was 4.1 and supratherapeutic for an INR goal of 1.5-2.0. Patient denied any extra doses of warfarin and denied changes to his medications. Patient reported not eating his usual salads and grapes. Since patient is unsure, if he will resume his previous diet with vitamin K foods, patient will reduce weekly warfarin dose from 16 mg to 14mg (~ 13%) and patient will hold warfarin doses today (10/15) and tomorrow (10/16). Starting Thursday (10/17), patient will take warfarin 2 mg daily. Patient requested to recheck INR in 3 week(s).     Interim History:    Warfarin start date: 10/1/21 (per chart review)  INR Goal:  1.5-2.0  (On-X)  Current warfarin regimen: hold doses today (10/15) and tomorrow (10/16). Starting Thursday (10/17), take warfarin 2 mg daily  Warfarin tablet strength:   1 mg, 2 mg   Duration of therapy: Indefinite      Results for orders placed or performed in visit on 11/05/24   POCT INR   Result Value Ref Range    Prothrombin time,(POC) 16.4     INR,(POC) 1.4 (A) 1.5 - 2.0       Today's pertinent positives includes:  Medication change and Change in diet/appetite    Patient reports missing 5 days of his fluconazole last week. Patient reports restarting fluconazole on 10/31.  Patient reports eating his usual serving of salads again.    Adherence:   Able to recall regimen? YES  Miss/extra dose? NO  Need refill? 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

## 2024-11-16 DIAGNOSIS — F33.9 EPISODE OF RECURRENT MAJOR DEPRESSIVE DISORDER, UNSPECIFIED DEPRESSION EPISODE SEVERITY (HCC): ICD-10-CM

## 2024-11-19 RX ORDER — SERTRALINE HYDROCHLORIDE 100 MG/1
100 TABLET, FILM COATED ORAL DAILY
Qty: 90 TABLET | Refills: 0 | Status: SHIPPED | OUTPATIENT
Start: 2024-11-19

## 2024-12-03 ENCOUNTER — ANTI-COAG VISIT (OUTPATIENT)
Facility: HOSPITAL | Age: 29
End: 2024-12-03
Payer: MEDICAID

## 2024-12-03 DIAGNOSIS — Z79.01 CURRENT USE OF LONG TERM ANTICOAGULATION: ICD-10-CM

## 2024-12-03 DIAGNOSIS — Z95.2 HISTORY OF MECHANICAL AORTIC VALVE REPLACEMENT: Primary | ICD-10-CM

## 2024-12-03 LAB
INTERNATIONAL NORMALIZATION RATIO, POC: 2.1 (ref 1.5–2)
PROTHROMBIN TIME, POC: 25.5

## 2024-12-03 PROCEDURE — 99211 OFF/OP EST MAY X REQ PHY/QHP: CPT

## 2024-12-03 PROCEDURE — 85610 PROTHROMBIN TIME: CPT

## 2024-12-03 NOTE — PROGRESS NOTES
Allergen Reactions    Penicillins Rash, Swelling and Other (See Comments)     Reaction Type: Allergy; Reaction(s): unspecified reaction as a baby     Current Outpatient Medications   Medication Sig    sertraline (ZOLOFT) 100 MG tablet TAKE 1 TABLET BY MOUTH DAILY    traZODone (DESYREL) 50 MG tablet TAKE 1 TABLET BY MOUTH EVERY NIGHT    carvedilol (COREG) 12.5 MG tablet TAKE 1 TABLET BY MOUTH TWICE DAILY    warfarin (COUMADIN) 2 MG tablet Take 1 tablet (2 mg) by mouth every day or take as directed by the clinic.    warfarin (COUMADIN) 1 MG tablet Take 1 tablet (1 mg) by mouth every Thursday or take as directed by the clinic.    ferrous sulfate (FEROSUL) 325 (65 Fe) MG tablet Take 1 tablet by mouth 2 times daily    pantoprazole (PROTONIX) 40 MG tablet Take 1 tablet by mouth daily    melatonin 3 MG TABS tablet Take 1 tablet by mouth nightly as needed (sleep)    levETIRAcetam (KEPPRA) 750 MG tablet Take 1 tablet by mouth 2 times daily    buprenorphine-naloxone (SUBOXONE) 8-2 MG FILM SL film DISSOLVE UP TO 2 FILMS UNDER TONGUE EVERY DAY    fluconazole (DIFLUCAN) 200 MG tablet Take 2 tablets by mouth daily     No current facility-administered medications for this visit.     Wt Readings from Last 3 Encounters:   08/21/24 127.5 kg (281 lb)   07/03/24 122.8 kg (270 lb 12.8 oz)   03/25/24 124.7 kg (275 lb)     BP Readings from Last 3 Encounters:   08/21/24 127/75   07/03/24 96/63   03/25/24 118/60     Pulse Readings from Last 3 Encounters:   08/21/24 67   07/03/24 83   03/25/24 70     Lab Results   Component Value Date/Time    WBC 6.0 07/03/2024 10:19 AM    HGB 12.3 07/03/2024 10:19 AM    HCT 37.5 07/03/2024 10:19 AM     07/03/2024 10:19 AM    MCV 77.3 07/03/2024 10:19 AM     Lab Results   Component Value Date/Time     07/03/2024 10:19 AM    K 4.2 07/03/2024 10:19 AM     07/03/2024 10:19 AM    CO2 25 07/03/2024 10:19 AM    BUN 26 07/03/2024 10:19 AM    GLOB 3.8 02/13/2024 08:33 AM    ALT 18 02/13/2024

## 2024-12-03 NOTE — PATIENT INSTRUCTIONS
anticoagulation clinic may adjust your dose of warfarin.    Follow-up care is a key part of your treatment and safety. Be sure to make and go to all appointments, and call your doctor if you are having problems. It's also a good idea to know your test results and keep a list of the medicines you take.    How can you care for yourself at home?  Take warfarin safely  Take your warfarin at the same time each day.  If you miss a dose of warfarin, don't take an extra dose to make up for it. Your doctor can tell you exactly what to do so you don't take too much or too little.  Wear medical alert jewelry that lets others know that you take warfarin. You can buy this at most drugstores.  Don't take warfarin if you are pregnant or planning to get pregnant. Talk to your doctor about how you can prevent getting pregnant while you are taking it.  Don't change your dose or stop taking warfarin unless your doctor tells you to.  Effects of medicines and food on warfarin  Don't start or stop taking any medicines, vitamins, or natural remedies unless you first talk to your doctor. Many medicines can affect how warfarin works. These include aspirin and other pain relievers, over-the-counter medicines, multivitamins, dietary supplements, and herbal products.  Tell all of your doctors and pharmacists that you take warfarin. Some prescription medicines can affect how warfarin works.  Keep the amount of vitamin K in your diet about the same from day to day. Do not suddenly eat a lot more or a lot less food that is rich in vitamin K than you usually do. Vitamin K affects how warfarin works and how your blood clots. Talk with your doctor before making big changes in your diet. Vitamin K is in many foods, such as:  Leafy greens, such as kale, cabbage, spinach, Swiss chard, and lettuce.  Canola and soybean oils.  Green vegetables, such as asparagus, broccoli, and [place] sprouts.  Vegetable drinks, green tea leaves, and some dietary

## 2025-01-07 ENCOUNTER — TELEPHONE (OUTPATIENT)
Facility: HOSPITAL | Age: 30
End: 2025-01-07

## 2025-01-07 NOTE — TELEPHONE ENCOUNTER
Medication Management Clinic - Reschedule Appointment    Called patient. Patient rescheduled his INR check appointment to next Tuesday, January 14th.    Thank you.  Chip Noriega, PharmD    For Pharmacy Admin Tracking Only    Program: Medication Management  CPA in place:  Yes  Recommendation Provided To: Patient/Caregiver: 1 via Telephone  Intervention Detail: Scheduled Appointment  Intervention Accepted By: Patient/Caregiver: 1  Time Spent (min): 10

## 2025-01-07 NOTE — TELEPHONE ENCOUNTER
Medication Management Clinic - Cancel Appointment    Patient left a voice message for the Med Management Clinic requesting to cancel his INR check appointment today (1/7/25) due to patient's father not feeling well. Left a voice message for patient to return call to reschedule appointment.    Thank you.  Chip Noriega, PharmD

## 2025-01-14 ENCOUNTER — TELEPHONE (OUTPATIENT)
Facility: HOSPITAL | Age: 30
End: 2025-01-14

## 2025-01-14 DIAGNOSIS — Z95.2 HISTORY OF MECHANICAL AORTIC VALVE REPLACEMENT: ICD-10-CM

## 2025-01-14 DIAGNOSIS — Z79.01 CURRENT USE OF LONG TERM ANTICOAGULATION: ICD-10-CM

## 2025-01-14 NOTE — TELEPHONE ENCOUNTER
Medication Management Clinic - Missed Appointment    Called and left a message for patient to return the Med Management Clinic's call regarding his missed INR check appointment today (1/14/25).    Thank you.  Chip Noriega, CristalD

## 2025-01-17 ENCOUNTER — TELEPHONE (OUTPATIENT)
Facility: HOSPITAL | Age: 30
End: 2025-01-17

## 2025-01-17 RX ORDER — WARFARIN SODIUM 2 MG/1
TABLET ORAL
Qty: 14 TABLET | Refills: 0 | Status: SHIPPED | OUTPATIENT
Start: 2025-01-17

## 2025-01-17 NOTE — TELEPHONE ENCOUNTER
Medication Management Clinic - Medication Refill    Received electronic request to refill patient's warfarin therapy. E-prescribed warfarin 2 mg tablets to [facility]. Only refilled a 14 day supply due to the last two INR checks being cancelled or missed. Patient has rescheduled his next INR check appointment for Tuesday, January 28th.    Thank you.  Chip Noriega, PharmD    For Pharmacy Admin Tracking Only    Intervention Detail: Refill(s) Provided  Total # of Interventions Recommended: 1  Total # of Interventions Accepted: 1  Time Spent (min): 10

## 2025-01-17 NOTE — TELEPHONE ENCOUNTER
Medication Management Clinic - Reschedule Appointment    Called patient. Patient rescheduled his missed INR check appointment to Tuesday, January 28th.    Thank you.  Chip Noriega, PharmD    For Pharmacy Admin Tracking Only    Program: Medication Management  CPA in place:  Yes  Recommendation Provided To: Patient/Caregiver: 1 via Telephone  Intervention Detail: Scheduled Appointment  Intervention Accepted By: Patient/Caregiver: 1  Gap Closed?: Yes   Time Spent (min): 10

## 2025-01-28 ENCOUNTER — ANTI-COAG VISIT (OUTPATIENT)
Facility: HOSPITAL | Age: 30
End: 2025-01-28
Payer: MEDICAID

## 2025-01-28 DIAGNOSIS — Z79.01 CURRENT USE OF LONG TERM ANTICOAGULATION: ICD-10-CM

## 2025-01-28 DIAGNOSIS — Z95.2 HISTORY OF MECHANICAL AORTIC VALVE REPLACEMENT: Primary | ICD-10-CM

## 2025-01-28 LAB
INTERNATIONAL NORMALIZATION RATIO, POC: 1.1 (ref 1.5–2)
PROTHROMBIN TIME, POC: 12.7

## 2025-01-28 PROCEDURE — 85610 PROTHROMBIN TIME: CPT

## 2025-01-28 PROCEDURE — 99213 OFFICE O/P EST LOW 20 MIN: CPT

## 2025-01-28 RX ORDER — WARFARIN SODIUM 2 MG/1
TABLET ORAL
Qty: 90 TABLET | Refills: 0 | Status: SHIPPED | OUTPATIENT
Start: 2025-01-28

## 2025-01-28 NOTE — PROGRESS NOTES
Pharmacy Progress Note - Anticoagulation Management    S/O:  Mr. Addison Isaac  is a 29 y.o. male seen today for anticoagulation management for the diagnosis of Aortic Valve Replacement.     HPI: At last visit (12/3), the patient's INR was 2.1 and supratherapeutic for an INR goal of 1.5-2.0. Patient denied any extra doses of warfarin and denied changes to his medications. Patient reported consistent intake of vitamin K foods. Since, patient's INR is slightly supratherapeutic today (12/2), patient will continue warfarin 2 mg daily. Encouraged patient to eat a serving of food with vitamin K within the next couple of days. Patient will recheck INR in 5 week(s) due to schedule conflicts in the upcoming weeks.     Interim History:    Warfarin start date: 10/1/21 (per chart review)  INR Goal:  1.5-2.0  (On-X)  Current warfarin regimen: 2 mg daily  Warfarin tablet strength:   1 mg, 2 mg   Duration of therapy: Indefinite      Results for orders placed or performed in visit on 01/28/25   POCT INR   Result Value Ref Range    Prothrombin time,(POC) 12.7     INR,(POC) 1.1 (A) 1.5 - 2.0       Today's pertinent positives includes:  Medication change    Patient reports missing his fluconazole for ~1 week and restarting fluconazole on Wednesday (1/22/25).    Adherence:   Able to recall regimen? YES  Miss/extra dose? YES - Patient reports missing warfarin doses prior to 1/20/25 (number of missed doses unknown).  Need refill? 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

## 2025-01-28 NOTE — PATIENT INSTRUCTIONS
"hydrochlorothiazide (HYDRODIURIL) 25 MG tablet 90 tablet 3 3/21/2019     metoprolol succinate ER (TOPROL-XL) 100 MG 24 hr tablet 90 tablet 3 3/21/2019     potassium chloride ER (K-DUR/KLOR-CON M) 20 MEQ CR tablet 90 tablet 3 3/21/2019     Last Written Prescription Date:  3/21/2019  Last Fill Quantity: 90,  # refills: 3   Last office visit: 1/8/2020 with prescribing provider: ANTONIO Casarez    Future Office Visit: Unknown    Requested Prescriptions   Pending Prescriptions Disp Refills     metoprolol succinate ER (TOPROL-XL) 100 MG 24 hr tablet [Pharmacy Med Name: METOPROLOL SUCCINATE ER TABS 100MG] 90 tablet 4     Sig: TAKE 1 TABLET DAILY       Beta-Blockers Protocol Passed - 2/10/2020  6:21 PM        Passed - Blood pressure under 140/90 in past 12 months     BP Readings from Last 3 Encounters:   01/08/20 130/72   08/08/19 127/77   04/23/19 149/88                 Passed - Patient is age 6 or older        Passed - Recent (12 mo) or future (30 days) visit within the authorizing provider's specialty     Patient has had an office visit with the authorizing provider or a provider within the authorizing providers department within the previous 12 mos or has a future within next 30 days. See \"Patient Info\" tab in inbasket, or \"Choose Columns\" in Meds & Orders section of the refill encounter.              Passed - Medication is active on med list        potassium chloride ER (K-DUR/KLOR-CON M) 20 MEQ CR tablet [Pharmacy Med Name: POT CHLOR ER (DISP) TABS 20MEQ] 90 tablet 4     Sig: TAKE 1 TABLET DAILY       Potassium Supplements Protocol Passed - 2/10/2020  6:21 PM        Passed - Recent (12 mo) or future (30 days) visit within the authorizing provider's department     Patient has had an office visit with the authorizing provider or a provider within the authorizing providers department within the previous 12 mos or has a future within next 30 days. See \"Patient Info\" tab in inbasket, or \"Choose Columns\" in Meds & Orders section of " Today your INR was 1.1 .      Your goal INR is  1.5-2.0 .    You have a   1 mg and 2 mg tablet of Coumadin (warfarin).   Take Coumadin (warfarin) as follows:    Take 4 mg today (1/28/25) and then starting tomorrow (1/29/25) resume 2 mg every day.    Come back in 2 week(s) for your next finger stick/INR blood test.        Avoid any over the counter items containing aspirin or ibuprofen, and avoid great swings in general diet.  Avoid alcohol consumption.  Please notify the INR pharmacist if you are started on any new medication including over the counter or herbal supplements. Also, please notify your INR pharmacist if any of your other prescription or over the counter medications have been discontinued.     Call [facility] at [phone] if you have any signs of abnormal bleeding/blood clot.  ------------------------------------------------------------------------------------------------------------------  Taking Warfarin Safely: Care Instructions    Your Care Instructions  Warfarin is a medicine that you take to prevent blood clots. It is often called a blood thinner. Doctors give warfarin (such as Coumadin) to reduce the risk of blood clots. You may be at risk for blood clots if you have atrial fibrillation or deep vein thrombosis. Some other health problems may also put you at risk.  Warfarin slows the amount of time it takes for your blood to clot. It can cause bleeding problems. Even if you've been taking warfarin for a while, it's important to know how to take it safely.  Foods and other medicines can affect the way warfarin works. Some can make warfarin work too well. This can cause bleeding problems. And some can make it work poorly, so that it does not prevent blood clots very well.  You will need regular blood tests to check how long it takes for your blood to form a clot. This test is called a PT or prothrombin time test. The result of the test is called an INR  "the refill encounter.              Passed - Medication is active on med list        Passed - Patient is age 18 or older        Passed - Normal serum potassium in past 12 months     Recent Labs   Lab Test 04/23/19  1125   POTASSIUM 4.0                    hydrochlorothiazide (HYDRODIURIL) 25 MG tablet [Pharmacy Med Name: HYDROCHLOROTHIAZIDE TAB 25MG] 90 tablet 4     Sig: TAKE 1 TABLET DAILY       Diuretics (Including Combos) Protocol Passed - 2/10/2020  6:21 PM        Passed - Blood pressure under 140/90 in past 12 months     BP Readings from Last 3 Encounters:   01/08/20 130/72   08/08/19 127/77   04/23/19 149/88                 Passed - Recent (12 mo) or future (30 days) visit within the authorizing provider's specialty     Patient has had an office visit with the authorizing provider or a provider within the authorizing providers department within the previous 12 mos or has a future within next 30 days. See \"Patient Info\" tab in inbasket, or \"Choose Columns\" in Meds & Orders section of the refill encounter.              Passed - Medication is active on med list        Passed - Patient is age 18 or older        Passed - No active pregancy on record        Passed - Normal serum creatinine on file in past 12 months     Recent Labs   Lab Test 03/21/19  1033   CR 0.64              Passed - Normal serum potassium on file in past 12 months     Recent Labs   Lab Test 04/23/19  1125   POTASSIUM 4.0                    Passed - Normal serum sodium on file in past 12 months     Recent Labs   Lab Test 03/21/19  1033                 Passed - No positive pregnancy test in past 12 months          "

## 2025-02-06 NOTE — PROGRESS NOTES
Pharmacy Progress Note - Anticoagulation Management    S/O:  Mr. Addison Isaac  is a 29 y.o. male seen today for anticoagulation management for the diagnosis of Aortic Valve Replacement.     HPI: At last visit (1/28), Patient's INR was 1.1 and subtherapeutic for an INR goal of 2.0-3.0. Patient reported consistent intake of vitamin K foods. Patient reports missing his fluconazole for ~1 week and restarting fluconazole on Wednesday (1/22/25). Fluconazole has the potential to enhance the therapeutic effects of warfarin. Patient reported missing warfarin doses prior to 1/20/25 (number of missed doses unknown). Since, patient's warfarin therapy and fluconazole therapy intake has not been consistent recently, patient was instructed to take warfarin 4 mg on (1/28/25) and then starting (1/29/25) resume warfarin 2 mg daily. Patient will recheck INR in 2 week(s). If patient's INR remains subtherapeutic at next visit, patient's weekly warfarin dose will be adjusted.    Interim History:    Warfarin start date: 10/1/21 (per chart review)  INR Goal:  1.5-2.0  (On-X)  Current warfarin regimen: 2 mg daily  Warfarin tablet strength:   1 mg, 2 mg   Duration of therapy: Indefinite      No results found for this visit on 02/11/25.      Today's pertinent positives includes:  Medication change    Patient reports resuming Fluconazole     Adherence:   Able to recall regimen? YES  Miss/extra dose? NO - Patient reports missing warfarin doses prior to 1/20/25 (number of missed doses unknown).  Need refill? 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

## 2025-02-06 NOTE — PATIENT INSTRUCTIONS
Today your INR was 1.7.      Your goal INR is  1.5-2.0 .    You have a   1 mg and 2 mg tablet of Coumadin (warfarin).   Take Coumadin (warfarin) as follows:    Take warfarin 2 mg every day.    Come back in 3 week(s) for your next finger stick/INR blood test.        Avoid any over the counter items containing aspirin or ibuprofen, and avoid great swings in general diet.  Avoid alcohol consumption.  Please notify the INR pharmacist if you are started on any new medication including over the counter or herbal supplements. Also, please notify your INR pharmacist if any of your other prescription or over the counter medications have been discontinued.     Call [facility] at [phone] if you have any signs of abnormal bleeding/blood clot.  ------------------------------------------------------------------------------------------------------------------  Taking Warfarin Safely: Care Instructions    Your Care Instructions  Warfarin is a medicine that you take to prevent blood clots. It is often called a blood thinner. Doctors give warfarin (such as Coumadin) to reduce the risk of blood clots. You may be at risk for blood clots if you have atrial fibrillation or deep vein thrombosis. Some other health problems may also put you at risk.  Warfarin slows the amount of time it takes for your blood to clot. It can cause bleeding problems. Even if you've been taking warfarin for a while, it's important to know how to take it safely.  Foods and other medicines can affect the way warfarin works. Some can make warfarin work too well. This can cause bleeding problems. And some can make it work poorly, so that it does not prevent blood clots very well.  You will need regular blood tests to check how long it takes for your blood to form a clot. This test is called a PT or prothrombin time test. The result of the test is called an INR level. Depending on the test results, your doctor or

## 2025-02-11 ENCOUNTER — ANTI-COAG VISIT (OUTPATIENT)
Facility: HOSPITAL | Age: 30
End: 2025-02-11
Payer: MEDICAID

## 2025-02-11 DIAGNOSIS — Z95.2 HISTORY OF MECHANICAL AORTIC VALVE REPLACEMENT: Primary | ICD-10-CM

## 2025-02-11 LAB
INTERNATIONAL NORMALIZATION RATIO, POC: 1.7 (ref 1.5–2)
PROTHROMBIN TIME, POC: 19.8

## 2025-02-11 PROCEDURE — 85610 PROTHROMBIN TIME: CPT

## 2025-02-11 PROCEDURE — 99211 OFF/OP EST MAY X REQ PHY/QHP: CPT

## 2025-03-04 NOTE — PROGRESS NOTES
Pharmacy Progress Note - Anticoagulation Management    S/O:  Mr. Addison Isaac  is a 30 y.o. male seen today for anticoagulation management for the diagnosis of Aortic Valve Replacement.     HPI: At last visit (2/11), Patient's INR was 1.7 and therapeutic for an INR goal of 1.5 - 2.0. Patient reported he is back on his fluconazole. Pt reported consistent intake of vitamin K foods. Patient will continue current warfarin regimen of 2 mg daily and recheck INR in 3 week(s).     Interim History:    Warfarin start date: 10/1/21 (per chart review)  INR Goal:  1.5-2.0  (On-X)  Current warfarin regimen: 2 mg daily  Warfarin tablet strength:   1 mg, 2 mg   Duration of therapy: Indefinite      Results for orders placed or performed in visit on 03/11/25   POCT INR   Result Value Ref Range    Prothrombin time,(POC) 18.6     INR,(POC) 1.6        Today's pertinent positives includes:  Medication change  Chronic Fluconazole     Adherence:   Able to recall regimen? YES  Miss/extra dose? NO   Need refill? 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

## 2025-03-11 ENCOUNTER — ANTI-COAG VISIT (OUTPATIENT)
Facility: HOSPITAL | Age: 30
End: 2025-03-11

## 2025-03-11 DIAGNOSIS — Z95.2 HISTORY OF MECHANICAL AORTIC VALVE REPLACEMENT: ICD-10-CM

## 2025-03-11 DIAGNOSIS — Z79.01 CURRENT USE OF LONG TERM ANTICOAGULATION: ICD-10-CM

## 2025-03-11 LAB
INTERNATIONAL NORMALIZATION RATIO, POC: 1.6
PROTHROMBIN TIME, POC: 18.6

## 2025-03-11 RX ORDER — WARFARIN SODIUM 2 MG/1
TABLET ORAL
Qty: 90 TABLET | Refills: 0 | Status: SHIPPED | OUTPATIENT
Start: 2025-03-11

## 2025-03-20 ENCOUNTER — OFFICE VISIT (OUTPATIENT)
Facility: CLINIC | Age: 30
End: 2025-03-20
Payer: MEDICAID

## 2025-03-20 VITALS
WEIGHT: 283 LBS | BODY MASS INDEX: 35.19 KG/M2 | SYSTOLIC BLOOD PRESSURE: 117 MMHG | TEMPERATURE: 97 F | HEART RATE: 75 BPM | RESPIRATION RATE: 20 BRPM | OXYGEN SATURATION: 96 % | HEIGHT: 75 IN | DIASTOLIC BLOOD PRESSURE: 72 MMHG

## 2025-03-20 DIAGNOSIS — Z13.1 ENCOUNTER FOR SCREENING FOR DIABETES MELLITUS: ICD-10-CM

## 2025-03-20 DIAGNOSIS — G47.09 OTHER INSOMNIA: ICD-10-CM

## 2025-03-20 DIAGNOSIS — E03.8 SUBCLINICAL HYPOTHYROIDISM: ICD-10-CM

## 2025-03-20 DIAGNOSIS — D50.9 IRON DEFICIENCY ANEMIA, UNSPECIFIED IRON DEFICIENCY ANEMIA TYPE: ICD-10-CM

## 2025-03-20 DIAGNOSIS — F33.9 EPISODE OF RECURRENT MAJOR DEPRESSIVE DISORDER, UNSPECIFIED DEPRESSION EPISODE SEVERITY: Primary | ICD-10-CM

## 2025-03-20 DIAGNOSIS — K21.9 GASTROESOPHAGEAL REFLUX DISEASE WITHOUT ESOPHAGITIS: ICD-10-CM

## 2025-03-20 DIAGNOSIS — E78.2 MIXED HYPERLIPIDEMIA: ICD-10-CM

## 2025-03-20 DIAGNOSIS — Z95.2 HISTORY OF MECHANICAL AORTIC VALVE REPLACEMENT: ICD-10-CM

## 2025-03-20 LAB
ALBUMIN SERPL-MCNC: 4.2 G/DL (ref 3.5–5)
ALBUMIN/GLOB SERPL: 1.2 (ref 1.1–2.2)
ALP SERPL-CCNC: 93 U/L (ref 45–117)
ALT SERPL-CCNC: 25 U/L (ref 12–78)
ANION GAP SERPL CALC-SCNC: 11 MMOL/L (ref 2–12)
AST SERPL-CCNC: ABNORMAL U/L (ref 15–37)
BILIRUB SERPL-MCNC: 0.6 MG/DL (ref 0.2–1)
BUN SERPL-MCNC: 31 MG/DL (ref 6–20)
BUN/CREAT SERPL: 23 (ref 12–20)
CALCIUM SERPL-MCNC: 9.7 MG/DL (ref 8.5–10.1)
CHLORIDE SERPL-SCNC: 108 MMOL/L (ref 97–108)
CHOLEST SERPL-MCNC: 234 MG/DL
CO2 SERPL-SCNC: 20 MMOL/L (ref 21–32)
CREAT SERPL-MCNC: 1.35 MG/DL (ref 0.7–1.3)
EST. AVERAGE GLUCOSE BLD GHB EST-MCNC: 91 MG/DL
GLOBULIN SER CALC-MCNC: 3.5 G/DL (ref 2–4)
GLUCOSE SERPL-MCNC: 110 MG/DL (ref 65–100)
HBA1C MFR BLD: 4.8 % (ref 4–5.6)
HDLC SERPL-MCNC: 33 MG/DL
HDLC SERPL: 7.1 (ref 0–5)
LDLC SERPL CALC-MCNC: 149.6 MG/DL (ref 0–100)
POTASSIUM SERPL-SCNC: ABNORMAL MMOL/L (ref 3.5–5.1)
PROT SERPL-MCNC: 7.7 G/DL (ref 6.4–8.2)
SODIUM SERPL-SCNC: 139 MMOL/L (ref 136–145)
T4 FREE SERPL-MCNC: 1.2 NG/DL (ref 0.8–1.5)
TRIGL SERPL-MCNC: 257 MG/DL
TSH SERPL DL<=0.05 MIU/L-ACNC: 2.91 UIU/ML (ref 0.36–3.74)
VLDLC SERPL CALC-MCNC: 51.4 MG/DL

## 2025-03-20 PROCEDURE — 99214 OFFICE O/P EST MOD 30 MIN: CPT | Performed by: STUDENT IN AN ORGANIZED HEALTH CARE EDUCATION/TRAINING PROGRAM

## 2025-03-20 RX ORDER — CARVEDILOL 12.5 MG/1
12.5 TABLET ORAL 2 TIMES DAILY
Qty: 180 TABLET | Refills: 1 | Status: SHIPPED | OUTPATIENT
Start: 2025-03-20

## 2025-03-20 RX ORDER — PANTOPRAZOLE SODIUM 40 MG/1
40 TABLET, DELAYED RELEASE ORAL DAILY
Qty: 90 TABLET | Refills: 1 | Status: SHIPPED | OUTPATIENT
Start: 2025-03-20

## 2025-03-20 RX ORDER — SERTRALINE HYDROCHLORIDE 100 MG/1
100 TABLET, FILM COATED ORAL DAILY
Qty: 90 TABLET | Refills: 1 | Status: SHIPPED | OUTPATIENT
Start: 2025-03-20

## 2025-03-20 RX ORDER — FERROUS SULFATE 325(65) MG
1 TABLET ORAL 2 TIMES DAILY
Qty: 180 TABLET | Refills: 1 | Status: SHIPPED | OUTPATIENT
Start: 2025-03-20

## 2025-03-20 RX ORDER — TRAZODONE HYDROCHLORIDE 50 MG/1
25 TABLET ORAL NIGHTLY
Qty: 45 TABLET | Refills: 1 | Status: SHIPPED | OUTPATIENT
Start: 2025-03-20

## 2025-03-20 SDOH — ECONOMIC STABILITY: FOOD INSECURITY: WITHIN THE PAST 12 MONTHS, YOU WORRIED THAT YOUR FOOD WOULD RUN OUT BEFORE YOU GOT MONEY TO BUY MORE.: NEVER TRUE

## 2025-03-20 SDOH — ECONOMIC STABILITY: FOOD INSECURITY: WITHIN THE PAST 12 MONTHS, THE FOOD YOU BOUGHT JUST DIDN'T LAST AND YOU DIDN'T HAVE MONEY TO GET MORE.: NEVER TRUE

## 2025-03-20 ASSESSMENT — ENCOUNTER SYMPTOMS
SHORTNESS OF BREATH: 0
ABDOMINAL PAIN: 0
RHINORRHEA: 0
VOMITING: 0
NAUSEA: 0
COUGH: 0

## 2025-03-20 ASSESSMENT — PATIENT HEALTH QUESTIONNAIRE - PHQ9
SUM OF ALL RESPONSES TO PHQ QUESTIONS 1-9: 0
SUM OF ALL RESPONSES TO PHQ QUESTIONS 1-9: 0
10. IF YOU CHECKED OFF ANY PROBLEMS, HOW DIFFICULT HAVE THESE PROBLEMS MADE IT FOR YOU TO DO YOUR WORK, TAKE CARE OF THINGS AT HOME, OR GET ALONG WITH OTHER PEOPLE: NOT DIFFICULT AT ALL
6. FEELING BAD ABOUT YOURSELF - OR THAT YOU ARE A FAILURE OR HAVE LET YOURSELF OR YOUR FAMILY DOWN: NOT AT ALL
8. MOVING OR SPEAKING SO SLOWLY THAT OTHER PEOPLE COULD HAVE NOTICED. OR THE OPPOSITE, BEING SO FIGETY OR RESTLESS THAT YOU HAVE BEEN MOVING AROUND A LOT MORE THAN USUAL: NOT AT ALL
1. LITTLE INTEREST OR PLEASURE IN DOING THINGS: NOT AT ALL
4. FEELING TIRED OR HAVING LITTLE ENERGY: NOT AT ALL
2. FEELING DOWN, DEPRESSED OR HOPELESS: NOT AT ALL
7. TROUBLE CONCENTRATING ON THINGS, SUCH AS READING THE NEWSPAPER OR WATCHING TELEVISION: NOT AT ALL
5. POOR APPETITE OR OVEREATING: NOT AT ALL
9. THOUGHTS THAT YOU WOULD BE BETTER OFF DEAD, OR OF HURTING YOURSELF: NOT AT ALL
SUM OF ALL RESPONSES TO PHQ QUESTIONS 1-9: 0
SUM OF ALL RESPONSES TO PHQ QUESTIONS 1-9: 0
3. TROUBLE FALLING OR STAYING ASLEEP: NOT AT ALL

## 2025-03-20 NOTE — PROGRESS NOTES
dentified pt with two pt identifiers(name and ).    Chief Complaint   Patient presents with    6 Month Follow-Up     Patient here for follow up on TSH & depression.  Also, refills on all medications.      Pantoprazole, Iron, carvedilol, sertraline,     Health Maintenance Due   Topic    Varicella vaccine (1 of 2 - 13+ 2-dose series)    Hepatitis B vaccine (1 of 3 - 19+ 3-dose series)    DTaP/Tdap/Td vaccine (5 - Td or Tdap)    Flu vaccine (1)    COVID-19 Vaccine (2 -  season)       Wt Readings from Last 3 Encounters:   25 128.4 kg (283 lb)   24 127.5 kg (281 lb)   24 122.8 kg (270 lb 12.8 oz)     Temp Readings from Last 3 Encounters:   25 97 °F (36.1 °C) (Temporal)   24 97.1 °F (36.2 °C) (Temporal)   24 97.3 °F (36.3 °C) (Temporal)     BP Readings from Last 3 Encounters:   25 117/72   24 127/75   24 96/63     Pulse Readings from Last 3 Encounters:   25 75   24 67   24 83           Coordination of Care Questionnaire:  :   1. \"Have you been to the ER, urgent care clinic since your last visit?  Hospitalized since your last visit?\" no    2. \"Have you seen or consulted any other health care providers outside of the [facility] since your last visit?\" no     3. For patients aged 45-75: Has the patient had a colonoscopy / FIT/ Cologuard? no      If the patient is female:    4. For patients aged 40-74: Has the patient had a mammogram within the past 2 years? no      5. For patients aged 21-65: Has the patient had a pap smear? no     3) Do you have an Advance Directive on file? no  Are you interested in receiving information about Advance Directives? no    Patient is accompanied by self I have received verbal consent from Addison Isaac to discuss any/all medical information while they are present in the room.   
headaches.         Objective:     Vitals:    03/20/25 0828   BP: 117/72   Pulse: 75   Resp: 20   Temp: 97 °F (36.1 °C)   SpO2: 96%          Vitals and Nurse Documentation reviewed.     Physical Exam  Constitutional:       General: He is not in acute distress.     Appearance: Normal appearance. He is obese. He is not ill-appearing.   HENT:      Head: Normocephalic and atraumatic.   Eyes:      Conjunctiva/sclera: Conjunctivae normal.   Cardiovascular:      Rate and Rhythm: Normal rate and regular rhythm.      Heart sounds: No murmur heard.     No friction rub. No gallop.      Comments: Systolic click appreciated on right and left sternal borders   Pulmonary:      Effort: Pulmonary effort is normal. No respiratory distress.      Breath sounds: Normal breath sounds. No wheezing, rhonchi or rales.   Abdominal:      General: Bowel sounds are normal. There is no distension.      Palpations: Abdomen is soft.      Tenderness: There is no abdominal tenderness. There is no guarding or rebound.   Musculoskeletal:      Cervical back: Neck supple.      Right lower leg: No edema.      Left lower leg: No edema.   Neurological:      Mental Status: He is alert and oriented to person, place, and time.         No results found for this visit on 03/20/25.

## 2025-03-26 ENCOUNTER — RESULTS FOLLOW-UP (OUTPATIENT)
Facility: CLINIC | Age: 30
End: 2025-03-26

## 2025-04-08 ENCOUNTER — TELEPHONE (OUTPATIENT)
Facility: HOSPITAL | Age: 30
End: 2025-04-08

## 2025-04-08 NOTE — TELEPHONE ENCOUNTER
[facility] - Reschedule Appointment    Patient contacted the [facility] and requested to cancel his INR check appointment today (4/8/25). Patient rescheduled his missed INR check appointment to Tuesday, April 22nd.    Thank you.  Chip Noriega, PharmD

## 2025-04-21 NOTE — PROGRESS NOTES
Pharmacy Progress Note - Anticoagulation Management    S/O:  Mr. Addison Isaac  is a 30 y.o. male seen today for anticoagulation management for the diagnosis of Aortic Valve Replacement.     HPI: At last visit (3/11), Patient's INR was 1.6 and therapeutic for an INR goal of 1.5 - 2.0. Pt reported consistent intake of vitamin K foods. Patient will continue current warfarin regimen of 2 mg daily and recheck INR in 4 week(s).    Interim History:    Warfarin start date: 10/1/21 (per chart review)  INR Goal:  1.5-2.0  (On-X)  Current warfarin regimen: 2 mg daily  Warfarin tablet strength:   1 mg, 2 mg   Duration of therapy: Indefinite      Results for orders placed or performed in visit on 04/22/25   POCT INR   Result Value Ref Range    Prothrombin time,(POC) 12.9     INR,(POC) 1.1 (A) 1.5 - 2.0       Today's pertinent positives includes:  Medication change    Patient reports he has not taken his fluconazole in ~1 week due to no refills on his prescription.    Adherence:   Able to recall regimen? YES  Miss/extra dose? NO   Need refill? 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

## 2025-04-21 NOTE — PATIENT INSTRUCTIONS
sprouts.  Vegetable drinks, green tea leaves, and some dietary supplement drinks.  Avoid cranberry juice and other cranberry products. They can increase the effects of warfarin.  Limit your use of alcohol.    Avoid bleeding by preventing falls and injuries  Wear slippers or shoes with nonskid soles.  Remove throw rugs and clutter.  Rearrange furniture and electrical cords to keep them out of walking paths.  Keep stairways, porches, and outside walkways well lit. Use night-lights in hallways and bathrooms.  Be extra careful when you work with sharp tools or knives.    When should you call for help?  Call 911 anytime you think you may need emergency care. For example, call if:  You have a sudden, severe headache that is different from past headaches.  Call your doctor now or seek immediate medical care if:  You have any abnormal bleeding, such as:  Nosebleeds.  Vaginal bleeding that is different (heavier, more frequent, at a different time of the month) than what you are used to.  Bloody or black stools, or rectal bleeding.  Bloody or pink urine.  Watch closely for changes in your health, and be sure to contact your doctor if you have any problems.    Where can you learn more?  Go to http://www.[employer].net/[employer].  Enter N655 in the search box to learn more about \"Taking Warfarin Safely: Care Instructions.\"  Current as of: January 27, 2016  Content Version: 11.1  © 4696-6319 [employer]. Care instructions adapted under license by [employer] (which disclaims liability or warranty for this information). If you have questions about a medical condition or this instruction, always ask your healthcare professional. [employer] disclaims any warranty or liability for your use of this information.

## 2025-04-22 ENCOUNTER — ANTI-COAG VISIT (OUTPATIENT)
Facility: HOSPITAL | Age: 30
End: 2025-04-22
Payer: MEDICAID

## 2025-04-22 DIAGNOSIS — Z95.2 HISTORY OF MECHANICAL AORTIC VALVE REPLACEMENT: Primary | ICD-10-CM

## 2025-04-22 LAB
INTERNATIONAL NORMALIZATION RATIO, POC: 1.1 (ref 1.5–2)
PROTHROMBIN TIME, POC: 12.9

## 2025-04-22 PROCEDURE — 85610 PROTHROMBIN TIME: CPT

## 2025-04-22 PROCEDURE — 99212 OFFICE O/P EST SF 10 MIN: CPT

## 2025-05-13 ENCOUNTER — TELEPHONE (OUTPATIENT)
Facility: HOSPITAL | Age: 30
End: 2025-05-13

## 2025-05-13 NOTE — TELEPHONE ENCOUNTER
[facility] - Reschedule Appointment    Patient left a message for the [facility] requesting to cancel his INR check appointment today (5/13/25). Returned patient's call. Patient rescheduled his INR check appointment to Tuesday, May 27th..    Thank you.  Chip Noriega, PharmD

## 2025-05-26 NOTE — PROGRESS NOTES
Pharmacy Progress Note - Anticoagulation Management    S/O:  Mr. Addison Isaac  is a 30 y.o. male seen today for anticoagulation management for the diagnosis of Aortic Valve Replacement.     HPI: At last visit (4/22), Patient's INR was 1.1 and subtherapeutic for an INR goal of 1.5-2.0. Patient denied any missed doses of warfarin. Patient reported consistent intake of vitamin K foods. Patient reported he has not taken his fluconazole in ~1 week due to no refills on his prescription. Patient's INR is subtherapeutic due to no fluconazole intake over the past 7 days. Patient will increase weekly warfarin dose from 14 mg to 17 mg (~21%) and patient will take warfarin 3 mg Tue, Thur, Sat; 2 mg daily ROW. Patient will recheck INR in 3 week(s) due to a schedule conflict in 2 weeks.    Interim History:    Warfarin start date: 10/1/21 (per chart review)  INR Goal:  1.5-2.0  (On-X)  Current warfarin regimen: 3 mg Tue, Thur, Sat; 2 mg daily ROW  Warfarin tablet strength:   1 mg, 2 mg   Duration of therapy: Indefinite      Results for orders placed or performed in visit on 05/27/25   POCT INR   Result Value Ref Range    Prothrombin time,(POC) 14.0     INR,(POC) 1.2 (A) 1.5 - 2.0       Today's pertinent positives includes:  Medication change    Patient reports he has not taken his fluconazole due to no refills on his prescription.    Adherence:   Able to recall regimen? YES  Miss/extra dose? NO   Need refill? 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

## 2025-05-26 NOTE — PATIENT INSTRUCTIONS
Today your INR was 1.2 .      Your goal INR is  1.5-2.0 .    You have a   1 mg and 2 mg tablet of Coumadin (warfarin).   Take Coumadin (warfarin) as follows:    Take 2 mg every Monday; and 3 mg all other days of the week.    Come back in 2 week(s) for your next finger stick/INR blood test.        Avoid any over the counter items containing aspirin or ibuprofen, and avoid great swings in general diet.  Avoid alcohol consumption.  Please notify the INR pharmacist if you are started on any new medication including over the counter or herbal supplements. Also, please notify your INR pharmacist if any of your other prescription or over the counter medications have been discontinued.     Call [facility] at [phone] if you have any signs of abnormal bleeding/blood clot.  ------------------------------------------------------------------------------------------------------------------  Taking Warfarin Safely: Care Instructions    Your Care Instructions  Warfarin is a medicine that you take to prevent blood clots. It is often called a blood thinner. Doctors give warfarin (such as Coumadin) to reduce the risk of blood clots. You may be at risk for blood clots if you have atrial fibrillation or deep vein thrombosis. Some other health problems may also put you at risk.  Warfarin slows the amount of time it takes for your blood to clot. It can cause bleeding problems. Even if you've been taking warfarin for a while, it's important to know how to take it safely.  Foods and other medicines can affect the way warfarin works. Some can make warfarin work too well. This can cause bleeding problems. And some can make it work poorly, so that it does not prevent blood clots very well.  You will need regular blood tests to check how long it takes for your blood to form a clot. This test is called a PT or prothrombin time test. The result of the test is called an INR level. Depending on the

## 2025-05-27 ENCOUNTER — ANTI-COAG VISIT (OUTPATIENT)
Facility: HOSPITAL | Age: 30
End: 2025-05-27
Payer: MEDICAID

## 2025-05-27 DIAGNOSIS — Z79.01 CURRENT USE OF LONG TERM ANTICOAGULATION: ICD-10-CM

## 2025-05-27 DIAGNOSIS — Z95.2 HISTORY OF MECHANICAL AORTIC VALVE REPLACEMENT: ICD-10-CM

## 2025-05-27 LAB
INTERNATIONAL NORMALIZATION RATIO, POC: 1.2 (ref 1.5–2)
PROTHROMBIN TIME, POC: 14

## 2025-05-27 PROCEDURE — 85610 PROTHROMBIN TIME: CPT

## 2025-05-27 PROCEDURE — 99213 OFFICE O/P EST LOW 20 MIN: CPT

## 2025-05-27 RX ORDER — WARFARIN SODIUM 1 MG/1
TABLET ORAL
Qty: 50 TABLET | Refills: 0 | Status: SHIPPED | OUTPATIENT
Start: 2025-05-27

## 2025-05-27 RX ORDER — WARFARIN SODIUM 2 MG/1
TABLET ORAL
Qty: 60 TABLET | Refills: 0 | Status: SHIPPED | OUTPATIENT
Start: 2025-05-27

## 2025-06-10 ENCOUNTER — TELEPHONE (OUTPATIENT)
Facility: HOSPITAL | Age: 30
End: 2025-06-10

## 2025-06-10 NOTE — TELEPHONE ENCOUNTER
[facility] - Missed Appointment    Called and left a message for patient to return the [facility]'s call regarding his missed INR check appointment today (6/10/25).    Thank you.  Chip Noriega, CristalD

## 2025-06-18 ENCOUNTER — OFFICE VISIT (OUTPATIENT)
Age: 30
End: 2025-06-18
Payer: MEDICAID

## 2025-06-18 VITALS
HEIGHT: 75 IN | BODY MASS INDEX: 36.06 KG/M2 | OXYGEN SATURATION: 95 % | WEIGHT: 290 LBS | SYSTOLIC BLOOD PRESSURE: 120 MMHG | DIASTOLIC BLOOD PRESSURE: 82 MMHG | HEART RATE: 66 BPM

## 2025-06-18 DIAGNOSIS — Z76.89 ENCOUNTER TO ESTABLISH CARE: ICD-10-CM

## 2025-06-18 DIAGNOSIS — R56.9 SEIZURES (HCC): ICD-10-CM

## 2025-06-18 DIAGNOSIS — Z95.2 HISTORY OF MECHANICAL AORTIC VALVE REPLACEMENT: Primary | ICD-10-CM

## 2025-06-18 PROCEDURE — 93005 ELECTROCARDIOGRAM TRACING: CPT | Performed by: SPECIALIST

## 2025-06-18 PROCEDURE — 99214 OFFICE O/P EST MOD 30 MIN: CPT | Performed by: SPECIALIST

## 2025-06-18 PROCEDURE — 93010 ELECTROCARDIOGRAM REPORT: CPT | Performed by: SPECIALIST

## 2025-06-18 NOTE — PATIENT INSTRUCTIONS
Patient Education        Learning About the Mediterranean Diet  What is the Mediterranean diet?     The Mediterranean diet is a style of eating rather than a diet plan. It features foods eaten in [place], [place], [place], and other countries along the [place] Sea. It emphasizes eating foods like fish, fruits, vegetables, beans, high-fiber breads and whole grains, nuts, and olive oil. This style of eating includes limited red meat, cheese, and sweets.  Why choose the Mediterranean diet?  A Mediterranean-style diet may improve heart health. It contains more fat than other heart-healthy diets. But the fats are mainly from nuts, unsaturated oils (such as fish oils and olive oil), and certain nut or seed oils (such as canola, soybean, or flaxseed oil). These fats may help protect the heart and blood vessels.  How can you get started on the Mediterranean diet?  Here are some things you can do to switch to a more Mediterranean way of eating.  What to eat  Eat a variety of fruits and vegetables each day, such as grapes, blueberries, tomatoes, broccoli, peppers, figs, olives, spinach, eggplant, beans, lentils, and chickpeas.  Eat a variety of whole-grain foods each day, such as oats, brown rice, and whole wheat bread, pasta, and couscous.  Eat fish at least 2 times a week. Try tuna, salmon, mackerel, lake trout, herring, or sardines.  Eat moderate amounts of low-fat dairy products, such as milk, cheese, or yogurt.  Eat moderate amounts of poultry and eggs.  Choose healthy (unsaturated) fats, such as nuts, olive oil, and certain nut or seed oils like canola, soybean, and flaxseed.  Limit unhealthy (saturated) fats, such as butter, palm oil, and coconut oil. And limit fats found in animal products, such as meat and dairy products made with whole milk. Try to eat red meat only a few times a month in very small amounts.  Limit sweets and desserts to only a few times a week. This includes sugar-sweetened

## 2025-06-18 NOTE — PROGRESS NOTES
Jamin Gaffney MD. [facility]          Patient: Addison Isaac  : 1995      Today's Date: 2025        HISTORY OF PRESENT ILLNESS:     History of Present Illness:    Doing OK - No CP or SOB - no bleeding.        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  Current Outpatient Medications   Medication Sig Dispense Refill    warfarin (COUMADIN) 2 MG tablet TAKE 1 TABLET (2 MG) BY MOUTH EVERY DAY OR TAKE AS DIRECTED BY THE CLINIC. 60 tablet 0    warfarin (COUMADIN) 1 MG tablet Take 1 tablet (1 mg) by mouth every Tuesday, Wednesday, Thursday, Friday, Saturday, and  or take as directed by the clinic. 50 tablet 0    carvedilol (COREG) 12.5 MG tablet Take 1 tablet by mouth 2 times daily 180 tablet 1    ferrous sulfate (FEROSUL) 325 (65 Fe) MG tablet Take 1 tablet by mouth 2 times daily 180 tablet 1    sertraline (ZOLOFT) 100 MG tablet Take 1 tablet by mouth daily 90 tablet 1    traZODone (DESYREL) 50 MG tablet Take 0.5 tablets by mouth nightly 45 tablet 1    levETIRAcetam (KEPPRA) 750 MG tablet Take 1 tablet by mouth 2 times daily      buprenorphine-naloxone (SUBOXONE) 8-2 MG FILM SL film Place 2.5 Film under the tongue daily.      pantoprazole (PROTONIX) 40 MG tablet Take 1

## 2025-06-18 NOTE — PROGRESS NOTES
Chief Complaint   Patient presents with    Follow-up     Previously saw Dr. Patel    Hyperlipidemia     Vitals:    06/18/25 0841   BP: 120/82   BP Site: Left Upper Arm   Patient Position: Sitting   Pulse: 66   SpO2: 95%   Weight: 131.5 kg (290 lb)   Height: 1.905 m (6' 3\")         Chest pain: DENIED     Recent hospital stays: DENIED     Refills: DENIED     PATIENT WOULD LIKE TO DISCUSS BEING SET UP FOR AT HOME INR TESTING, PLEASE ADVISE PATIENT, THANKS.

## 2025-06-24 ENCOUNTER — TELEPHONE (OUTPATIENT)
Age: 30
End: 2025-06-24

## 2025-06-24 NOTE — TELEPHONE ENCOUNTER
Spoke to pt today,and had left messages for him to call me so we can discuss Home INR machine.Pt states she has too many appts,and it hard to keep up with everything.Pt encourged to set up appt with  anticoagulation  clinic as before and I would send in another Rx request to another company,and see if he qualifies for their machine.